# Patient Record
Sex: FEMALE | Race: WHITE | Employment: UNEMPLOYED | URBAN - METROPOLITAN AREA
[De-identification: names, ages, dates, MRNs, and addresses within clinical notes are randomized per-mention and may not be internally consistent; named-entity substitution may affect disease eponyms.]

---

## 2018-03-19 ENCOUNTER — HOSPITAL ENCOUNTER (OUTPATIENT)
Age: 83
Discharge: HOME | End: 2018-03-21
Payer: MEDICARE

## 2018-03-19 LAB
ALBUMIN SERPL-MCNC: 4.3 G/DL (ref 3.5–5.2)
ALP BLD-CCNC: 55 U/L (ref 35–104)
ALT SERPL-CCNC: 12 U/L (ref 0–32)
ANION GAP SERPL CALCULATED.3IONS-SCNC: 13 MMOL/L (ref 7–16)
AST SERPL-CCNC: 20 U/L (ref 0–31)
BASOPHILS ABSOLUTE: 0.03 E9/L (ref 0–0.2)
BASOPHILS RELATIVE PERCENT: 0.5 % (ref 0–2)
BILIRUB SERPL-MCNC: 0.4 MG/DL (ref 0–1.2)
BUN BLDV-MCNC: 14 MG/DL (ref 8–23)
CALCIUM SERPL-MCNC: 9.4 MG/DL (ref 8.6–10.2)
CHLORIDE BLD-SCNC: 99 MMOL/L (ref 98–107)
CHOLESTEROL, TOTAL: 169 MG/DL (ref 0–199)
CO2: 29 MMOL/L (ref 22–29)
CREAT SERPL-MCNC: 0.7 MG/DL (ref 0.5–1)
EOSINOPHILS ABSOLUTE: 0.3 E9/L (ref 0.05–0.5)
EOSINOPHILS RELATIVE PERCENT: 5.5 % (ref 0–6)
GFR AFRICAN AMERICAN: >60
GFR NON-AFRICAN AMERICAN: >60 ML/MIN/1.73
GLUCOSE BLD-MCNC: 182 MG/DL (ref 74–109)
HBA1C MFR BLD: 8.2 % (ref 4.8–5.9)
HCT VFR BLD CALC: 44.1 % (ref 34–48)
HDLC SERPL-MCNC: 51 MG/DL
HEMOGLOBIN: 14 G/DL (ref 11.5–15.5)
IMMATURE GRANULOCYTES #: 0.02 E9/L
IMMATURE GRANULOCYTES %: 0.4 % (ref 0–5)
LDL CHOLESTEROL CALCULATED: 83 MG/DL (ref 0–99)
LYMPHOCYTES ABSOLUTE: 1.79 E9/L (ref 1.5–4)
LYMPHOCYTES RELATIVE PERCENT: 32.5 % (ref 20–42)
MCH RBC QN AUTO: 29.9 PG (ref 26–35)
MCHC RBC AUTO-ENTMCNC: 31.7 % (ref 32–34.5)
MCV RBC AUTO: 94.2 FL (ref 80–99.9)
MONOCYTES ABSOLUTE: 0.76 E9/L (ref 0.1–0.95)
MONOCYTES RELATIVE PERCENT: 13.8 % (ref 2–12)
NEUTROPHILS ABSOLUTE: 2.6 E9/L (ref 1.8–7.3)
NEUTROPHILS RELATIVE PERCENT: 47.3 % (ref 43–80)
PDW BLD-RTO: 13.1 FL (ref 11.5–15)
PLATELET # BLD: 181 E9/L (ref 130–450)
PMV BLD AUTO: 11 FL (ref 7–12)
POTASSIUM SERPL-SCNC: 4.6 MMOL/L (ref 3.5–5)
RBC # BLD: 4.68 E12/L (ref 3.5–5.5)
SODIUM BLD-SCNC: 141 MMOL/L (ref 132–146)
TOTAL PROTEIN: 7.4 G/DL (ref 6.4–8.3)
TRIGL SERPL-MCNC: 174 MG/DL (ref 0–149)
TSH SERPL DL<=0.05 MIU/L-ACNC: 2.81 UIU/ML (ref 0.27–4.2)
VLDLC SERPL CALC-MCNC: 35 MG/DL
WBC # BLD: 5.5 E9/L (ref 4.5–11.5)

## 2018-03-19 PROCEDURE — 84443 ASSAY THYROID STIM HORMONE: CPT

## 2018-03-19 PROCEDURE — 83036 HEMOGLOBIN GLYCOSYLATED A1C: CPT

## 2018-03-19 PROCEDURE — 85025 COMPLETE CBC W/AUTO DIFF WBC: CPT

## 2018-03-19 PROCEDURE — 80061 LIPID PANEL: CPT

## 2018-03-19 PROCEDURE — 80053 COMPREHEN METABOLIC PANEL: CPT

## 2018-06-18 ENCOUNTER — HOSPITAL ENCOUNTER (OUTPATIENT)
Age: 83
Discharge: HOME | End: 2018-06-20
Payer: MEDICARE

## 2018-06-18 LAB
BILIRUBIN URINE: NEGATIVE
BLOOD, URINE: NEGATIVE
CLARITY: CLEAR
COLOR: YELLOW
GLUCOSE URINE: >=1000 MG/DL
HBA1C MFR BLD: 8.1 % (ref 4.8–5.9)
KETONES, URINE: NEGATIVE MG/DL
LEUKOCYTE ESTERASE, URINE: NEGATIVE
NITRITE, URINE: NEGATIVE
PH UA: 6 (ref 5–9)
PROTEIN UA: NEGATIVE MG/DL
SPECIFIC GRAVITY UA: <=1.005 (ref 1–1.03)
UROBILINOGEN, URINE: 0.2 E.U./DL

## 2018-06-18 PROCEDURE — 81003 URINALYSIS AUTO W/O SCOPE: CPT

## 2018-06-18 PROCEDURE — 83036 HEMOGLOBIN GLYCOSYLATED A1C: CPT

## 2018-08-08 ENCOUNTER — APPOINTMENT (OUTPATIENT)
Dept: GENERAL RADIOLOGY | Age: 83
End: 2018-08-08
Payer: MEDICARE

## 2018-08-08 ENCOUNTER — HOSPITAL ENCOUNTER (EMERGENCY)
Age: 83
Discharge: HOME | End: 2018-08-08
Attending: EMERGENCY MEDICINE
Payer: MEDICARE

## 2018-08-08 VITALS
TEMPERATURE: 98.8 F | BODY MASS INDEX: 24.75 KG/M2 | HEART RATE: 75 BPM | HEIGHT: 64 IN | WEIGHT: 145 LBS | RESPIRATION RATE: 16 BRPM | OXYGEN SATURATION: 94 % | SYSTOLIC BLOOD PRESSURE: 111 MMHG | DIASTOLIC BLOOD PRESSURE: 54 MMHG

## 2018-08-08 DIAGNOSIS — S92.355A NONDISPLACED FRACTURE OF FIFTH METATARSAL BONE, LEFT FOOT, INITIAL ENCOUNTER FOR CLOSED FRACTURE: Primary | ICD-10-CM

## 2018-08-08 PROCEDURE — 72100 X-RAY EXAM L-S SPINE 2/3 VWS: CPT

## 2018-08-08 PROCEDURE — 73070 X-RAY EXAM OF ELBOW: CPT

## 2018-08-08 PROCEDURE — 99283 EMERGENCY DEPT VISIT LOW MDM: CPT

## 2018-08-08 PROCEDURE — 73630 X-RAY EXAM OF FOOT: CPT

## 2018-08-08 PROCEDURE — 73110 X-RAY EXAM OF WRIST: CPT

## 2018-08-08 PROCEDURE — 73502 X-RAY EXAM HIP UNI 2-3 VIEWS: CPT

## 2018-08-08 PROCEDURE — 6370000000 HC RX 637 (ALT 250 FOR IP): Performed by: EMERGENCY MEDICINE

## 2018-08-08 PROCEDURE — 73590 X-RAY EXAM OF LOWER LEG: CPT

## 2018-08-08 PROCEDURE — 73030 X-RAY EXAM OF SHOULDER: CPT

## 2018-08-08 PROCEDURE — 73552 X-RAY EXAM OF FEMUR 2/>: CPT

## 2018-08-08 RX ORDER — LANCETS 33 GAUGE
EACH MISCELLANEOUS
COMMUNITY
End: 2018-10-02 | Stop reason: ALTCHOICE

## 2018-08-08 RX ORDER — HYDROCODONE BITARTRATE AND ACETAMINOPHEN 5; 325 MG/1; MG/1
1 TABLET ORAL ONCE
Status: COMPLETED | OUTPATIENT
Start: 2018-08-08 | End: 2018-08-08

## 2018-08-08 RX ORDER — RIVASTIGMINE 4.6 MG/24H
1 PATCH, EXTENDED RELEASE TRANSDERMAL NIGHTLY
COMMUNITY
End: 2018-10-02 | Stop reason: ALTCHOICE

## 2018-08-08 RX ORDER — IBUPROFEN 800 MG/1
800 TABLET ORAL EVERY 8 HOURS PRN
Qty: 21 TABLET | Refills: 0 | Status: SHIPPED | OUTPATIENT
Start: 2018-08-08 | End: 2018-10-02 | Stop reason: ALTCHOICE

## 2018-08-08 RX ORDER — ACARBOSE 25 MG/1
25 TABLET ORAL
COMMUNITY

## 2018-08-08 RX ADMIN — HYDROCODONE BITARTRATE AND ACETAMINOPHEN 1 TABLET: 5; 325 TABLET ORAL at 22:48

## 2018-08-08 ASSESSMENT — PAIN SCALES - GENERAL
PAINLEVEL_OUTOF10: 8
PAINLEVEL_OUTOF10: 10

## 2018-08-08 ASSESSMENT — PAIN DESCRIPTION - FREQUENCY: FREQUENCY: CONTINUOUS

## 2018-08-08 ASSESSMENT — ENCOUNTER SYMPTOMS
SORE THROAT: 0
DIARRHEA: 0
EYE PAIN: 0
WHEEZING: 0
COUGH: 0
SINUS PRESSURE: 0
VOMITING: 0
EYE DISCHARGE: 0
NAUSEA: 0
EYE REDNESS: 0
SHORTNESS OF BREATH: 0
ABDOMINAL DISTENTION: 0
BACK PAIN: 0

## 2018-08-08 ASSESSMENT — PAIN DESCRIPTION - LOCATION: LOCATION: FOOT;HIP;LEG

## 2018-08-08 ASSESSMENT — PAIN DESCRIPTION - ORIENTATION: ORIENTATION: RIGHT

## 2018-08-08 ASSESSMENT — PAIN DESCRIPTION - DESCRIPTORS: DESCRIPTORS: SORE

## 2018-08-09 NOTE — ED NOTES
Pt to and from xray via cart. Pt medicated for pain and post op shoe applied. Pt in no apparent distress.       Jesús Tenorio RN  08/08/18 [id number]

## 2018-08-09 NOTE — ED PROVIDER NOTES
are normal. She exhibits no distension. There is no tenderness. There is no rebound and no guarding. Musculoskeletal:        Right shoulder: She exhibits tenderness and bony tenderness. She exhibits no swelling, no effusion, no crepitus and no deformity. Right elbow: She exhibits no swelling, no effusion and no deformity. Tenderness found. Olecranon process tenderness noted. Right wrist: She exhibits tenderness and bony tenderness. She exhibits no swelling, no effusion, no crepitus and no deformity. Right hip: She exhibits tenderness and bony tenderness. She exhibits no swelling, no crepitus and no deformity. Right knee: She exhibits no swelling, no effusion, no ecchymosis and no deformity. No tenderness found. Right ankle: She exhibits no swelling, no ecchymosis and no deformity. No tenderness. Lumbar back: She exhibits tenderness and bony tenderness. She exhibits no swelling, no edema and no deformity. Right upper arm: She exhibits no tenderness, no bony tenderness, no swelling, no edema and no deformity. Right forearm: Normal. She exhibits no tenderness, no bony tenderness, no swelling, no edema and no deformity. Right hand: Normal. She exhibits no tenderness, no bony tenderness, no deformity and no swelling. Right upper leg: She exhibits no tenderness, no bony tenderness, no swelling, no edema and no deformity. Right lower leg: She exhibits tenderness and bony tenderness. She exhibits no swelling, no edema and no deformity. Right foot: There is tenderness, bony tenderness and swelling. There is no crepitus and no deformity. Neurological: She is alert and oriented to person, place, and time. Skin: Skin is warm and dry. She is not diaphoretic. Nursing note and vitals reviewed.       Procedures    [facility]              --------------------------------------------- PAST HISTORY ---------------------------------------------  Past Medical History:  has a past medical history of Diabetes mellitus ([address] Hypertension; Tachycardia; and TIA (transient ischemic attack). Past Surgical History:  has a past surgical history that includes joint replacement. Social History:  reports that she has never smoked. She has never used smokeless tobacco. She reports that she does not drink alcohol or use drugs. Family History: family history includes Arthritis in her father, maternal aunt, maternal uncle, mother, paternal aunt, and paternal uncle; Diabetes in her maternal aunt, mother, paternal aunt, and sister; Hearing Loss in her mother and sister; Heart Disease in her maternal grandmother and maternal uncle; High Blood Pressure in her mother and sister; High Cholesterol in her sister; Kidney Disease in her sister. The patients home medications have been reviewed. Allergies: Sulfa antibiotics    -------------------------------------------------- RESULTS -------------------------------------------------  Labs:  No results found for this visit on 08/08/18. Radiology:  XR FOOT RIGHT (MIN 3 VIEWS)   Final Result      Hip: No acute findings. Femur: No acute findings. Tibia/fibula: No acute findings. Foot: Nondisplaced fracture base of the fifth metatarsal.      XR HIP RIGHT (2-3 VIEWS)   Final Result      Hip: No acute findings. Femur: No acute findings. Tibia/fibula: No acute findings. Foot: Nondisplaced fracture base of the fifth metatarsal.      XR FEMUR RIGHT (MIN 2 VIEWS)   Final Result      Hip: No acute findings. Femur: No acute findings. Tibia/fibula: No acute findings. Foot: Nondisplaced fracture base of the fifth metatarsal.      XR TIBIA FIBULA RIGHT (2 VIEWS)   Final Result      Hip: No acute findings. Femur: No acute findings. Tibia/fibula: No acute findings.       Foot: Nondisplaced fracture base of the fifth metatarsal.      XR LUMBAR SPINE (2-3 VIEWS)   Final Result   No acute findings. XR SHOULDER RIGHT (MIN 2 VIEWS)   Final Result   Shoulder: No acute findings. Elbow: No acute findings. Wrist: No acute findings. XR ELBOW RIGHT (2 VIEWS)   Final Result   Shoulder: No acute findings. Elbow: No acute findings. Wrist: No acute findings. XR WRIST RIGHT (MIN 3 VIEWS)   Final Result   Shoulder: No acute findings. Elbow: No acute findings. Wrist: No acute findings. ------------------------- NURSING NOTES AND VITALS REVIEWED ---------------------------  Date / Time Roomed:  8/8/2018  9:25 PM  ED Bed Assignment:  [id number]    The nursing notes within the ED encounter and vital signs as below have been reviewed. BP (!) 111/54   Pulse 75   Temp 98.8 °F (37.1 °C) (Oral)   Resp 16   Ht 5' 4\" (1.626 m)   Wt 145 lb (65.8 kg)   SpO2 94%   BMI 24.89 kg/m²   Oxygen Saturation Interpretation: Normal      ------------------------------------------ PROGRESS NOTES ------------------------------------------  I have spoken with the patient and discussed todays results, in addition to providing specific details for the plan of care and counseling regarding the diagnosis and prognosis. Their questions are answered at this time and they are agreeable with the plan. I discussed at length with them reasons for immediate return here for re evaluation. They will followup with primary care by calling their office tomorrow. --------------------------------- ADDITIONAL PROVIDER NOTES ---------------------------------  At this time the patient is without objective evidence of an acute process requiring hospitalization or inpatient management. They have remained hemodynamically stable throughout their entire ED visit and are stable for discharge with outpatient follow-up. The plan has been discussed in detail and they are aware of the specific conditions for emergent return, as well as the importance of follow-up.       New Prescriptions IBUPROFEN (IBU) 800 MG TABLET    Take 1 tablet by mouth every 8 hours as needed for Pain       Diagnosis:  1. Nondisplaced fracture of fifth metatarsal bone, left foot, initial encounter for closed fracture        Disposition:  Patient's disposition: Discharge to home  Patient's condition is stable.          [address] [facility]  08/08/18 [id number]

## 2018-10-02 ENCOUNTER — APPOINTMENT (OUTPATIENT)
Dept: CT IMAGING | Age: 83
DRG: 872 | End: 2018-10-02
Payer: MEDICARE

## 2018-10-02 ENCOUNTER — HOSPITAL ENCOUNTER (INPATIENT)
Age: 83
LOS: 4 days | Discharge: NURSING, REHAB OR LONG-TERM CARE | DRG: 872 | End: 2018-10-06
Attending: EMERGENCY MEDICINE | Admitting: INTERNAL MEDICINE
Payer: MEDICARE

## 2018-10-02 ENCOUNTER — APPOINTMENT (OUTPATIENT)
Dept: GENERAL RADIOLOGY | Age: 83
DRG: 872 | End: 2018-10-02
Payer: MEDICARE

## 2018-10-02 DIAGNOSIS — J18.9 PNEUMONIA DUE TO ORGANISM: ICD-10-CM

## 2018-10-02 DIAGNOSIS — A41.9 SEPSIS, DUE TO UNSPECIFIED ORGANISM: Primary | ICD-10-CM

## 2018-10-02 DIAGNOSIS — R09.02 HYPOXIA: ICD-10-CM

## 2018-10-02 DIAGNOSIS — R53.83 LETHARGY: ICD-10-CM

## 2018-10-02 DIAGNOSIS — N39.0 URINARY TRACT INFECTION WITHOUT HEMATURIA, SITE UNSPECIFIED: ICD-10-CM

## 2018-10-02 LAB
ALBUMIN SERPL-MCNC: 3.7 G/DL (ref 3.5–5.2)
ALP BLD-CCNC: 78 U/L (ref 35–104)
ALT SERPL-CCNC: 12 U/L (ref 0–32)
ANION GAP SERPL CALCULATED.3IONS-SCNC: 15 MMOL/L (ref 7–16)
AST SERPL-CCNC: 16 U/L (ref 0–31)
BACTERIA: ABNORMAL /HPF
BASOPHILS ABSOLUTE: 0 E9/L (ref 0–0.2)
BASOPHILS RELATIVE PERCENT: 0 % (ref 0–2)
BILIRUB SERPL-MCNC: 0.4 MG/DL (ref 0–1.2)
BILIRUBIN URINE: NEGATIVE
BLOOD, URINE: ABNORMAL
BUN BLDV-MCNC: 24 MG/DL (ref 8–23)
CALCIUM SERPL-MCNC: 8.6 MG/DL (ref 8.6–10.2)
CHLORIDE BLD-SCNC: 93 MMOL/L (ref 98–107)
CHP ED QC CHECK: NORMAL
CLARITY: ABNORMAL
CO2: 24 MMOL/L (ref 22–29)
COLOR: YELLOW
CREAT SERPL-MCNC: 0.9 MG/DL (ref 0.5–1)
EKG ATRIAL RATE: 87 BPM
EKG P AXIS: 8 DEGREES
EKG P-R INTERVAL: 130 MS
EKG Q-T INTERVAL: 380 MS
EKG QRS DURATION: 94 MS
EKG QTC CALCULATION (BAZETT): 457 MS
EKG R AXIS: -50 DEGREES
EKG T AXIS: 75 DEGREES
EKG VENTRICULAR RATE: 87 BPM
EOSINOPHILS ABSOLUTE: 0 E9/L (ref 0.05–0.5)
EOSINOPHILS RELATIVE PERCENT: 0 % (ref 0–6)
EPITHELIAL CELLS, UA: ABNORMAL /HPF
GFR AFRICAN AMERICAN: >60
GFR NON-AFRICAN AMERICAN: 58 ML/MIN/1.73
GLUCOSE BLD-MCNC: 288 MG/DL
GLUCOSE BLD-MCNC: 290 MG/DL (ref 74–109)
GLUCOSE URINE: >=1000 MG/DL
HCT VFR BLD CALC: 35.2 % (ref 34–48)
HEMOGLOBIN: 11.8 G/DL (ref 11.5–15.5)
KETONES, URINE: 15 MG/DL
LACTIC ACID: 1.3 MMOL/L (ref 0.5–2.2)
LEUKOCYTE ESTERASE, URINE: ABNORMAL
LIPASE: 17 U/L (ref 13–60)
LYMPHOCYTES ABSOLUTE: 0.62 E9/L (ref 1.5–4)
LYMPHOCYTES RELATIVE PERCENT: 7 % (ref 20–42)
MCH RBC QN AUTO: 30.6 PG (ref 26–35)
MCHC RBC AUTO-ENTMCNC: 33.5 % (ref 32–34.5)
MCV RBC AUTO: 91.4 FL (ref 80–99.9)
MONOCYTES ABSOLUTE: 1.42 E9/L (ref 0.1–0.95)
MONOCYTES RELATIVE PERCENT: 16 % (ref 2–12)
NEUTROPHILS ABSOLUTE: 6.85 E9/L (ref 1.8–7.3)
NEUTROPHILS RELATIVE PERCENT: 77 % (ref 43–80)
NITRITE, URINE: NEGATIVE
PDW BLD-RTO: 12.6 FL (ref 11.5–15)
PH UA: 5.5 (ref 5–9)
PLATELET # BLD: 163 E9/L (ref 130–450)
PMV BLD AUTO: 10.1 FL (ref 7–12)
POC ANION GAP: 17
POC BUN: 23
POC CHLORIDE: 95
POC CO2: 24
POC CREATININE: 0.9
POC POTASSIUM: NORMAL
POC SODIUM: 131
POTASSIUM SERPL-SCNC: 4.3 MMOL/L (ref 3.5–5)
PROTEIN UA: ABNORMAL MG/DL
RBC # BLD: 3.85 E12/L (ref 3.5–5.5)
RBC # BLD: NORMAL 10*6/UL
RBC UA: ABNORMAL /HPF (ref 0–2)
SODIUM BLD-SCNC: 132 MMOL/L (ref 132–146)
SPECIFIC GRAVITY UA: <=1.005 (ref 1–1.03)
TOTAL PROTEIN: 7.2 G/DL (ref 6.4–8.3)
TROPONIN: <0.01 NG/ML (ref 0–0.03)
UROBILINOGEN, URINE: 0.2 E.U./DL
WBC # BLD: 8.9 E9/L (ref 4.5–11.5)
WBC UA: >20 /HPF (ref 0–5)

## 2018-10-02 PROCEDURE — 74177 CT ABD & PELVIS W/CONTRAST: CPT

## 2018-10-02 PROCEDURE — 93005 ELECTROCARDIOGRAM TRACING: CPT | Performed by: EMERGENCY MEDICINE

## 2018-10-02 PROCEDURE — 36415 COLL VENOUS BLD VENIPUNCTURE: CPT

## 2018-10-02 PROCEDURE — 6370000000 HC RX 637 (ALT 250 FOR IP): Performed by: EMERGENCY MEDICINE

## 2018-10-02 PROCEDURE — 85025 COMPLETE CBC W/AUTO DIFF WBC: CPT

## 2018-10-02 PROCEDURE — 83605 ASSAY OF LACTIC ACID: CPT

## 2018-10-02 PROCEDURE — 87581 M.PNEUMON DNA AMP PROBE: CPT

## 2018-10-02 PROCEDURE — 87040 BLOOD CULTURE FOR BACTERIA: CPT

## 2018-10-02 PROCEDURE — 84484 ASSAY OF TROPONIN QUANT: CPT

## 2018-10-02 PROCEDURE — 87798 DETECT AGENT NOS DNA AMP: CPT

## 2018-10-02 PROCEDURE — 2580000003 HC RX 258: Performed by: EMERGENCY MEDICINE

## 2018-10-02 PROCEDURE — 87486 CHLMYD PNEUM DNA AMP PROBE: CPT

## 2018-10-02 PROCEDURE — 2060000000 HC ICU INTERMEDIATE R&B

## 2018-10-02 PROCEDURE — 99212 OFFICE O/P EST SF 10 MIN: CPT

## 2018-10-02 PROCEDURE — 87088 URINE BACTERIA CULTURE: CPT

## 2018-10-02 PROCEDURE — 87633 RESP VIRUS 12-25 TARGETS: CPT

## 2018-10-02 PROCEDURE — 71045 X-RAY EXAM CHEST 1 VIEW: CPT

## 2018-10-02 PROCEDURE — 81001 URINALYSIS AUTO W/SCOPE: CPT

## 2018-10-02 PROCEDURE — 6360000002 HC RX W HCPCS: Performed by: EMERGENCY MEDICINE

## 2018-10-02 PROCEDURE — 80053 COMPREHEN METABOLIC PANEL: CPT

## 2018-10-02 PROCEDURE — 6360000004 HC RX CONTRAST MEDICATION: Performed by: RADIOLOGY

## 2018-10-02 PROCEDURE — 70450 CT HEAD/BRAIN W/O DYE: CPT

## 2018-10-02 PROCEDURE — 83690 ASSAY OF LIPASE: CPT

## 2018-10-02 PROCEDURE — 99285 EMERGENCY DEPT VISIT HI MDM: CPT

## 2018-10-02 PROCEDURE — 87186 SC STD MICRODIL/AGAR DIL: CPT

## 2018-10-02 PROCEDURE — 2500000003 HC RX 250 WO HCPCS: Performed by: EMERGENCY MEDICINE

## 2018-10-02 RX ORDER — ACARBOSE 25 MG/1
25 TABLET ORAL
Status: DISCONTINUED | OUTPATIENT
Start: 2018-10-03 | End: 2018-10-06 | Stop reason: HOSPADM

## 2018-10-02 RX ORDER — ATENOLOL 25 MG/1
25 TABLET ORAL 2 TIMES DAILY
Status: DISCONTINUED | OUTPATIENT
Start: 2018-10-03 | End: 2018-10-06 | Stop reason: HOSPADM

## 2018-10-02 RX ORDER — ACETAMINOPHEN 500 MG
1000 TABLET ORAL ONCE
Status: COMPLETED | OUTPATIENT
Start: 2018-10-02 | End: 2018-10-02

## 2018-10-02 RX ORDER — ASPIRIN 81 MG/1
81 TABLET ORAL NIGHTLY
Status: DISCONTINUED | OUTPATIENT
Start: 2018-10-02 | End: 2018-10-06 | Stop reason: HOSPADM

## 2018-10-02 RX ORDER — OYSTER SHELL CALCIUM WITH VITAMIN D 500; 200 MG/1; [IU]/1
1 TABLET, FILM COATED ORAL EVERY EVENING
Status: DISCONTINUED | OUTPATIENT
Start: 2018-10-03 | End: 2018-10-06 | Stop reason: HOSPADM

## 2018-10-02 RX ORDER — 0.9 % SODIUM CHLORIDE 0.9 %
500 INTRAVENOUS SOLUTION INTRAVENOUS ONCE
Status: COMPLETED | OUTPATIENT
Start: 2018-10-02 | End: 2018-10-02

## 2018-10-02 RX ORDER — LOVASTATIN 20 MG/1
40 TABLET ORAL EVERY EVENING
Status: DISCONTINUED | OUTPATIENT
Start: 2018-10-02 | End: 2018-10-02 | Stop reason: CLARIF

## 2018-10-02 RX ORDER — GLIMEPIRIDE 4 MG/1
4 TABLET ORAL 2 TIMES DAILY WITH MEALS
Status: DISCONTINUED | OUTPATIENT
Start: 2018-10-03 | End: 2018-10-04

## 2018-10-02 RX ORDER — AZITHROMYCIN 250 MG/1
500 TABLET, FILM COATED ORAL DAILY
Status: DISCONTINUED | OUTPATIENT
Start: 2018-10-03 | End: 2018-10-03

## 2018-10-02 RX ORDER — SIMVASTATIN 20 MG
20 TABLET ORAL NIGHTLY
Status: DISCONTINUED | OUTPATIENT
Start: 2018-10-03 | End: 2018-10-06 | Stop reason: HOSPADM

## 2018-10-02 RX ORDER — GLIMEPIRIDE 4 MG/1
4 TABLET ORAL 2 TIMES DAILY WITH MEALS
Status: ON HOLD | COMMUNITY
End: 2018-10-06 | Stop reason: HOSPADM

## 2018-10-02 RX ORDER — MULTIVIT-MIN/IRON/FOLIC ACID/K 18-600-40
1 CAPSULE ORAL EVERY EVENING
Status: DISCONTINUED | OUTPATIENT
Start: 2018-10-02 | End: 2018-10-02 | Stop reason: CLARIF

## 2018-10-02 RX ORDER — M-VIT,TX,IRON,MINS/CALC/FOLIC 27MG-0.4MG
1 TABLET ORAL
Status: DISCONTINUED | OUTPATIENT
Start: 2018-10-03 | End: 2018-10-06 | Stop reason: HOSPADM

## 2018-10-02 RX ORDER — LOSARTAN POTASSIUM 50 MG/1
100 TABLET ORAL EVERY EVENING
Status: DISCONTINUED | OUTPATIENT
Start: 2018-10-03 | End: 2018-10-06 | Stop reason: HOSPADM

## 2018-10-02 RX ORDER — ACARBOSE 25 MG/1
25 TABLET ORAL
Status: DISCONTINUED | OUTPATIENT
Start: 2018-10-03 | End: 2018-10-02 | Stop reason: CLARIF

## 2018-10-02 RX ORDER — M-VIT,TX,IRON,MINS/CALC/FOLIC 27MG-0.4MG
1 TABLET ORAL DAILY
COMMUNITY

## 2018-10-02 RX ORDER — RIVASTIGMINE 9.5 MG/24H
1 PATCH, EXTENDED RELEASE TRANSDERMAL NIGHTLY
Status: DISCONTINUED | OUTPATIENT
Start: 2018-10-03 | End: 2018-10-06 | Stop reason: HOSPADM

## 2018-10-02 RX ORDER — ACETAMINOPHEN 500 MG
500 TABLET ORAL
Status: DISCONTINUED | OUTPATIENT
Start: 2018-10-02 | End: 2018-10-06 | Stop reason: HOSPADM

## 2018-10-02 RX ORDER — AMLODIPINE BESYLATE 2.5 MG/1
2.5 TABLET ORAL EVERY EVENING
Status: DISCONTINUED | OUTPATIENT
Start: 2018-10-03 | End: 2018-10-06 | Stop reason: HOSPADM

## 2018-10-02 RX ORDER — MULTIVIT-MIN/IRON/FOLIC ACID/K 18-600-40
1 CAPSULE ORAL EVERY EVENING
Status: ON HOLD | COMMUNITY
End: 2018-10-06 | Stop reason: HOSPADM

## 2018-10-02 RX ORDER — MECLIZINE HCL 12.5 MG/1
25 TABLET ORAL EVERY 8 HOURS PRN
Status: DISCONTINUED | OUTPATIENT
Start: 2018-10-02 | End: 2018-10-06 | Stop reason: HOSPADM

## 2018-10-02 RX ORDER — RIVASTIGMINE 9.5 MG/24H
1 PATCH, EXTENDED RELEASE TRANSDERMAL DAILY
COMMUNITY

## 2018-10-02 RX ORDER — 0.9 % SODIUM CHLORIDE 0.9 %
1000 INTRAVENOUS SOLUTION INTRAVENOUS ONCE
Status: COMPLETED | OUTPATIENT
Start: 2018-10-02 | End: 2018-10-02

## 2018-10-02 RX ORDER — SODIUM CHLORIDE 450 MG/100ML
INJECTION, SOLUTION INTRAVENOUS CONTINUOUS
Status: DISCONTINUED | OUTPATIENT
Start: 2018-10-02 | End: 2018-10-06 | Stop reason: HOSPADM

## 2018-10-02 RX ORDER — PAROXETINE HYDROCHLORIDE 20 MG/1
30 TABLET, FILM COATED ORAL EVERY EVENING
Status: DISCONTINUED | OUTPATIENT
Start: 2018-10-02 | End: 2018-10-06 | Stop reason: HOSPADM

## 2018-10-02 RX ORDER — ACETAMINOPHEN 500 MG
500 TABLET ORAL 2 TIMES DAILY
COMMUNITY
End: 2019-08-02 | Stop reason: ALTCHOICE

## 2018-10-02 RX ORDER — OMEGA-3/DHA/EPA/FISH OIL 300-1000MG
1 CAPSULE ORAL
Status: DISCONTINUED | OUTPATIENT
Start: 2018-10-02 | End: 2018-10-02 | Stop reason: CLARIF

## 2018-10-02 RX ADMIN — DOXYCYCLINE 100 MG: 100 INJECTION, POWDER, LYOPHILIZED, FOR SOLUTION INTRAVENOUS at 20:46

## 2018-10-02 RX ADMIN — IOPAMIDOL 80 ML: 755 INJECTION, SOLUTION INTRAVENOUS at 19:14

## 2018-10-02 RX ADMIN — SODIUM CHLORIDE 500 ML: 9 INJECTION, SOLUTION INTRAVENOUS at 19:34

## 2018-10-02 RX ADMIN — SODIUM CHLORIDE 500 ML: 9 INJECTION, SOLUTION INTRAVENOUS at 18:00

## 2018-10-02 RX ADMIN — CEFTRIAXONE 1 G: 1 INJECTION, POWDER, FOR SOLUTION INTRAMUSCULAR; INTRAVENOUS at 20:37

## 2018-10-02 RX ADMIN — ACETAMINOPHEN 1000 MG: 500 TABLET, FILM COATED ORAL at 18:00

## 2018-10-02 RX ADMIN — SODIUM CHLORIDE 1000 ML: 9 INJECTION, SOLUTION INTRAVENOUS at 20:51

## 2018-10-02 ASSESSMENT — ENCOUNTER SYMPTOMS
SORE THROAT: 0
VOMITING: 1
SHORTNESS OF BREATH: 0
SINUS PRESSURE: 0
COUGH: 1
NAUSEA: 1
DIARRHEA: 0
WHEEZING: 0
BACK PAIN: 0
EYE PAIN: 0
ABDOMINAL PAIN: 1

## 2018-10-02 NOTE — ED NOTES
Pt pulled out of shared waiting room     Doris Estrada, [facility]  10/02/18 2533

## 2018-10-02 NOTE — ED PROVIDER NOTES
Tachycardia; and TIA (transient ischemic attack). Past Surgical History:  has a past surgical history that includes joint replacement. Social History:  reports that she has never smoked. She has never used smokeless tobacco. She reports that she does not drink alcohol or use drugs. Family History: family history includes Arthritis in her father, maternal aunt, maternal uncle, mother, paternal aunt, and paternal uncle; Diabetes in her maternal aunt, mother, paternal aunt, and sister; Hearing Loss in her mother and sister; Heart Disease in her maternal grandmother and maternal uncle; High Blood Pressure in her mother and sister; High Cholesterol in her sister; Kidney Disease in her sister. The patients home medications have been reviewed. Allergies: Sulfa antibiotics    -------------------------------------------------- RESULTS -------------------------------------------------    EKG Interpretation    Interpreted by emergency department physician    Rhythm: normal sinus   Rate: 87  Axis: left  Ectopy: none  Conduction: normal  ST Segments: normal  T Waves: normal  Q Waves: none    Clinical Impression: Normal sinus rhythm.     Krystal Meyer      Lab  Results for orders placed or performed during the hospital encounter of 10/02/18   CBC auto differential   Result Value Ref Range    WBC 8.9 4.5 - 11.5 E9/L    RBC 3.85 3.50 - 5.50 E12/L    Hemoglobin 11.8 11.5 - 15.5 g/dL    Hematocrit 35.2 34.0 - 48.0 %    MCV 91.4 80.0 - 99.9 fL    MCH 30.6 26.0 - 35.0 pg    MCHC 33.5 32.0 - 34.5 %    RDW 12.6 11.5 - 15.0 fL    Platelets 677 716 - 592 E9/L    MPV 10.1 7.0 - 12.0 fL    Neutrophils % 77.0 43.0 - 80.0 %    Lymphocytes % 7.0 (L) 20.0 - 42.0 %    Monocytes % 16.0 (H) 2.0 - 12.0 %    Eosinophils % 0.0 0.0 - 6.0 %    Basophils % 0.0 0.0 - 2.0 %    Neutrophils # 6.85 1.80 - 7.30 E9/L    Lymphocytes # 0.62 (L) 1.50 - 4.00 E9/L    Monocytes # 1.42 (H) 0.10 - 0.95 E9/L    Eosinophils # 0.00 (L) 0.05 - 0.50 E9/L Basophils # 0.00 0.00 - 0.20 E9/L    RBC Morphology Normal    Comprehensive Metabolic Panel   Result Value Ref Range    Sodium 132 132 - 146 mmol/L    Potassium 4.3 3.5 - 5.0 mmol/L    Chloride 93 (L) 98 - 107 mmol/L    CO2 24 22 - 29 mmol/L    Anion Gap 15 7 - 16 mmol/L    Glucose 290 (H) 74 - 109 mg/dL    BUN 24 (H) 8 - 23 mg/dL    CREATININE 0.9 0.5 - 1.0 mg/dL    GFR Non-African American 58 >=60 mL/min/1.73    GFR African American >60     Calcium 8.6 8.6 - 10.2 mg/dL    Total Protein 7.2 6.4 - 8.3 g/dL    Alb 3.7 3.5 - 5.2 g/dL    Total Bilirubin 0.4 0.0 - 1.2 mg/dL    Alkaline Phosphatase 78 35 - 104 U/L    ALT 12 0 - 32 U/L    AST 16 0 - 31 U/L   Lipase   Result Value Ref Range    Lipase 17 13 - 60 U/L   Lactic Acid, Plasma   Result Value Ref Range    Lactic Acid 1.3 0.5 - 2.2 mmol/L   Troponin   Result Value Ref Range    Troponin <0.01 0.00 - 0.03 ng/mL   Urinalysis with Microscopic   Result Value Ref Range    Color, UA Yellow Straw/Yellow    Clarity, UA CLOUDY (A) Clear    Glucose, Ur >=1000 (A) Negative mg/dL    Bilirubin Urine Negative Negative    Ketones, Urine 15 (A) Negative mg/dL    Specific Gravity, UA <=1.005 1.005 - 1.030    Blood, Urine SMALL (A) Negative    pH, UA 5.5 5.0 - 9.0    Protein, UA TRACE Negative mg/dL    Urobilinogen, Urine 0.2 <2.0 E.U./dL    Nitrite, Urine Negative Negative    Leukocyte Esterase, Urine SMALL (A) Negative    WBC, UA >20 0 - 5 /HPF    RBC, UA 5-10 (A) 0 - 2 /HPF    Epi Cells MANY /HPF    Bacteria, UA MANY (A) /HPF   POCT chem basic w/ ICA   Result Value Ref Range    POC BUN 23     POC Chloride 95     POC Creatinine 0.9     POC Anion Gap 17     POC Glucose 288     POC Potassium      POC Sodium 131     POC CO2 24     QC OK? ok    EKG 12 Lead   Result Value Ref Range    Ventricular Rate 87 BPM    Atrial Rate 87 BPM    P-R Interval 130 ms    QRS Duration 94 ms    Q-T Interval 380 ms    QTc Calculation (Bazett) 457 ms    P Axis 8 degrees    R Axis -50 degrees    T Axis 75 degrees       Radiology  Ct Head Wo Contrast    Result Date: 10/2/2018  LOCATION: 200 EXAM: CT HEAD WO CONTRAST COMPARISON: None HISTORY: Lethargy TECHNIQUE: Noncontrast helical CT images were performed of the head. Coronal and sagittal reconstructions also obtained. Automated dose control was used for this exam. CONTRAST: No intravenous contrast administered. FINDINGS: HEAD: There is moderate diffuse atrophy. A moderate amount of patchy periventricular and subcortical white matter hypodensity noted, likely chronic microvascular ischemic related. There is no evidence of intracranial hemorrhage or mass. No extra-axial fluid is seen. The paranasal sinuses are clear. The globes and orbits are normal.  No abnormalities of the calvarium are identified. 1. No acute findings. Ct Abdomen Pelvis W Iv Contrast Additional Contrast? None    Result Date: 10/2/2018  LOCATION:200 EXAM: CT ABDOMEN PELVIS W IV CONTRAST COMPARISON: None HISTORY: Left-sided pain TECHNIQUE:Contrast-enhanced helical abdomen and pelvis CT was performed. Coronal and sagittal reconstructions also obtained. Automated dose control was used for this exam. CONTRAST: 80 mL Isovue-370 intravenous contrast was administered. No oral contrast administered. FINDINGS: SUPPORT DEVICES: None LOWER THORAX: Limited evaluation of the lower chest demonstrates clear lung bases bilaterally. SOLID ORGANS: The liver, spleen, pancreas, and adrenal glands are normal. BILIARY SYSTEM: No evidence of biliary ductal dilatation. GENITOURINARY: The kidneys are normal in appearance bilaterally with no evidence of hydronephrosis. No stones are appreciated. The bladder is unremarkable. GASTROINTESTINAL: The stomach, small and large bowel are normal in caliber without evidence of focal wall thickening. There is no evidence of bowel obstruction. APPENDIX: Within normal limits without adjacent fat stranding. FLUID COLLECTIONS: None.  LYMPH NODES: No adenopathy by size

## 2018-10-02 NOTE — ED PROVIDER NOTES
------------------------- NURSING NOTES AND VITALS REVIEWED ---------------------------   The nursing notes within the ED encounter and vital signs as below have been reviewed. /73   Pulse 86   Temp 102.9 °F (39.4 °C) (Oral)   Resp 20   Wt 145 lb (65.8 kg)   SpO2 (!) 88%   BMI 24.89 kg/m²   Oxygen Saturation Interpretation: Normal      ---------------------------------------------------PHYSICAL EXAM--------------------------------------      Constitutional/General: Lethargic, ill-appearing, falls asleep in a wheelchair arouses easily to verbal stimuli but does not answer questions appropriately. Head: Normocephalic and atraumatic  Eyes: Conjunctiva clear  Mouth: Oropharynx clear, handling secretions, no trismus  Neck: Supple, full ROM,   Pulmonary: Lungs crackles bilaterally in the lower lung fields  Cardiovascular:  Regular rate and rhythm,   Abdomen: Soft,  Tender in the left lower quadrant, non distended,   Extremities: Wheelchair. Warm and well perfused  Skin: warm and dry, pale  Neurologic: GCS 15,  Psych: Normal Affect      ------------------------------ ED COURSE/MEDICAL DECISION MAKING----------------------  Medications - No data to display      ED COURSE:       Medical Decision Making:    Patient is lethargic in the wheelchair but she does respond to verbal stimuli and opens her eyes and verbally responds but does not seem to answer appropriately. Her daughter states that she has some dementia. She has some crackles in her lower lung fields. Temp is 102.9--- oxygen saturation on room air is 88% respirations are 20 and easy. I did tell the daughter that this patient is too ill for urgent care. Daughter agrees to have her transported via ambulance. Did discuss with [place] ED--- I did spoke with Dr. Kaushal Heaton. I informed him of the patient's condition.      --------------------------------- IMPRESSION AND DISPOSITION ---------------------------------    IMPRESSION  1.  Fever, unspecified fever cause    2. Lethargy    3. Vomiting without nausea, intractability of vomiting not specified, unspecified vomiting type        DISPOSITION  Disposition: Transfer to ED via ambulance        NOTE: This report was transcribed using voice recognition software.  Every effort was made to ensure accuracy; however, inadvertent computerized transcription errors may be present     JUSTINA Flores CNP  10/02/18 [id number]

## 2018-10-03 ENCOUNTER — APPOINTMENT (OUTPATIENT)
Dept: GENERAL RADIOLOGY | Age: 83
DRG: 872 | End: 2018-10-03
Payer: MEDICARE

## 2018-10-03 LAB
ALBUMIN SERPL-MCNC: 3.1 G/DL (ref 3.5–5.2)
ALP BLD-CCNC: 62 U/L (ref 35–104)
ALT SERPL-CCNC: 10 U/L (ref 0–32)
ANION GAP SERPL CALCULATED.3IONS-SCNC: 12 MMOL/L (ref 7–16)
AST SERPL-CCNC: 16 U/L (ref 0–31)
BILIRUB SERPL-MCNC: 0.2 MG/DL (ref 0–1.2)
BUN BLDV-MCNC: 17 MG/DL (ref 8–23)
CALCIUM SERPL-MCNC: 8.1 MG/DL (ref 8.6–10.2)
CHLORIDE BLD-SCNC: 102 MMOL/L (ref 98–107)
CO2: 25 MMOL/L (ref 22–29)
CREAT SERPL-MCNC: 0.7 MG/DL (ref 0.5–1)
FILM ARRAY ADENOVIRUS: NORMAL
FILM ARRAY BORDETELLA PERTUSSIS: NORMAL
FILM ARRAY CHLAMYDOPHILIA PNEUMONIAE: NORMAL
FILM ARRAY CORONAVIRUS 229E: NORMAL
FILM ARRAY CORONAVIRUS HKU1: NORMAL
FILM ARRAY CORONAVIRUS NL63: NORMAL
FILM ARRAY CORONAVIRUS OC43: NORMAL
FILM ARRAY INFLUENZA A VIRUS 09H1: NORMAL
FILM ARRAY INFLUENZA A VIRUS H1: NORMAL
FILM ARRAY INFLUENZA A VIRUS H3: NORMAL
FILM ARRAY INFLUENZA A VIRUS: NORMAL
FILM ARRAY INFLUENZA B: NORMAL
FILM ARRAY METAPNEUMOVIRUS: NORMAL
FILM ARRAY MYCOPLASMA PNEUMONIAE: NORMAL
FILM ARRAY PARAINFLUENZA VIRUS 1: NORMAL
FILM ARRAY PARAINFLUENZA VIRUS 2: NORMAL
FILM ARRAY PARAINFLUENZA VIRUS 3: NORMAL
FILM ARRAY PARAINFLUENZA VIRUS 4: NORMAL
FILM ARRAY RESPIRATORY SYNCITIAL VIRUS: NORMAL
FILM ARRAY RHINOVIRUS/ENTEROVIRUS: NORMAL
GFR AFRICAN AMERICAN: >60
GFR NON-AFRICAN AMERICAN: >60 ML/MIN/1.73
GLUCOSE BLD-MCNC: 184 MG/DL (ref 74–109)
HCT VFR BLD CALC: 32.9 % (ref 34–48)
HEMOGLOBIN: 10.7 G/DL (ref 11.5–15.5)
MCH RBC QN AUTO: 30.3 PG (ref 26–35)
MCHC RBC AUTO-ENTMCNC: 32.5 % (ref 32–34.5)
MCV RBC AUTO: 93.2 FL (ref 80–99.9)
METER GLUCOSE: 125 MG/DL (ref 70–110)
METER GLUCOSE: 175 MG/DL (ref 70–110)
PDW BLD-RTO: 12.7 FL (ref 11.5–15)
PLATELET # BLD: 143 E9/L (ref 130–450)
PMV BLD AUTO: 10 FL (ref 7–12)
POTASSIUM SERPL-SCNC: 4.3 MMOL/L (ref 3.5–5)
RBC # BLD: 3.53 E12/L (ref 3.5–5.5)
SODIUM BLD-SCNC: 139 MMOL/L (ref 132–146)
TOTAL PROTEIN: 6.2 G/DL (ref 6.4–8.3)
WBC # BLD: 9.4 E9/L (ref 4.5–11.5)

## 2018-10-03 PROCEDURE — 36415 COLL VENOUS BLD VENIPUNCTURE: CPT

## 2018-10-03 PROCEDURE — 6370000000 HC RX 637 (ALT 250 FOR IP): Performed by: INTERNAL MEDICINE

## 2018-10-03 PROCEDURE — 2700000000 HC OXYGEN THERAPY PER DAY

## 2018-10-03 PROCEDURE — 2580000003 HC RX 258: Performed by: INTERNAL MEDICINE

## 2018-10-03 PROCEDURE — 71046 X-RAY EXAM CHEST 2 VIEWS: CPT

## 2018-10-03 PROCEDURE — 82962 GLUCOSE BLOOD TEST: CPT

## 2018-10-03 PROCEDURE — 80053 COMPREHEN METABOLIC PANEL: CPT

## 2018-10-03 PROCEDURE — 2060000000 HC ICU INTERMEDIATE R&B

## 2018-10-03 PROCEDURE — 6360000002 HC RX W HCPCS: Performed by: INTERNAL MEDICINE

## 2018-10-03 PROCEDURE — 85027 COMPLETE CBC AUTOMATED: CPT

## 2018-10-03 RX ADMIN — OYSTER SHELL CALCIUM WITH VITAMIN D 1 TABLET: 500; 200 TABLET, FILM COATED ORAL at 01:21

## 2018-10-03 RX ADMIN — ASPIRIN 81 MG: 81 TABLET, DELAYED RELEASE ORAL at 20:36

## 2018-10-03 RX ADMIN — GLIMEPIRIDE 4 MG: 4 TABLET ORAL at 08:43

## 2018-10-03 RX ADMIN — PAROXETINE HYDROCHLORIDE 30 MG: 20 TABLET, FILM COATED ORAL at 01:21

## 2018-10-03 RX ADMIN — ACETAMINOPHEN 500 MG: 500 TABLET, FILM COATED ORAL at 12:01

## 2018-10-03 RX ADMIN — SIMVASTATIN 20 MG: 20 TABLET, FILM COATED ORAL at 20:37

## 2018-10-03 RX ADMIN — MULTIPLE VITAMINS W/ MINERALS TAB 1 TABLET: TAB at 12:01

## 2018-10-03 RX ADMIN — LINAGLIPTIN 5 MG: 5 TABLET, FILM COATED ORAL at 01:21

## 2018-10-03 RX ADMIN — ASPIRIN 81 MG: 81 TABLET, DELAYED RELEASE ORAL at 01:21

## 2018-10-03 RX ADMIN — ACETAMINOPHEN 500 MG: 500 TABLET, FILM COATED ORAL at 01:21

## 2018-10-03 RX ADMIN — ATENOLOL 25 MG: 25 TABLET ORAL at 20:36

## 2018-10-03 RX ADMIN — SODIUM CHLORIDE: 4.5 INJECTION, SOLUTION INTRAVENOUS at 01:29

## 2018-10-03 RX ADMIN — SODIUM CHLORIDE 1 G: 9 INJECTION INTRAMUSCULAR; INTRAVENOUS; SUBCUTANEOUS at 20:35

## 2018-10-03 RX ADMIN — PAROXETINE HYDROCHLORIDE 30 MG: 20 TABLET, FILM COATED ORAL at 20:35

## 2018-10-03 RX ADMIN — SODIUM CHLORIDE: 4.5 INJECTION, SOLUTION INTRAVENOUS at 12:01

## 2018-10-03 RX ADMIN — GLIMEPIRIDE 4 MG: 4 TABLET ORAL at 18:03

## 2018-10-03 RX ADMIN — ATENOLOL 25 MG: 25 TABLET ORAL at 08:43

## 2018-10-03 RX ADMIN — LINAGLIPTIN 5 MG: 5 TABLET, FILM COATED ORAL at 18:03

## 2018-10-03 RX ADMIN — OYSTER SHELL CALCIUM WITH VITAMIN D 1 TABLET: 500; 200 TABLET, FILM COATED ORAL at 18:03

## 2018-10-03 RX ADMIN — LOSARTAN POTASSIUM 100 MG: 50 TABLET ORAL at 20:35

## 2018-10-03 ASSESSMENT — PAIN SCALES - GENERAL
PAINLEVEL_OUTOF10: 0

## 2018-10-03 NOTE — H&P
positive leukocyte esterase. She was given  Rocephin in the ER 1 gm and she was given doxycycline IV, not sure why. The chest x-ray was read portable negative and it was portable again. The  ER physician and the resident felt they saw abnormality and they wanted to  cover her for pneumonia, although I doubt the diagnosis. Her EKG normal  sinus rhythm, but did show left axis deviation with mild poor R-wave  progression. Her CT of the head other than moderate diffuse atrophy and  chronic microvascular ischemic changes was negative for any acute bleed or  masses. CT of the abdomen and pelvis with IV contrast was obtained, most  likely due to the flank pain and showed no abnormality whatsoever. Seeing  the patient today, she feels 80% better, pleasant, answers questions and  smiling. MEDICATIONS:  Her meds were reviewed and all renewed. SOCIAL HISTORY:  Lives with her daughter. No smoke, no alcohol, no drugs. PAST SURGICAL HISTORY:  Joint replacement. FAMILY HISTORY:  Noncontributory. ALLERGIES:  SULFA. REVIEW OF SYSTEMS:  Otherwise, denied any hemoptysis, hematemesis,  hematochezia. Currently, she denies any cough or any abdominal pain. PHYSICAL EXAMINATION:  GENERAL:  Not in any distress, pleasant, alert, awake, disoriented, but  answers question appropriately and follows commands. VITAL SIGNS:  Temp 98.8, heart rate 68, pulse 16, blood pressure 115/54,  pulse ox 95% on 1 L of nasal cannula. SKIN:  Normal.  HEENT:  Within normal limits. No oral exudates. NECK:  No lymphadenopathy. No JVD. HEART:  No rubs, murmurs, or gallops noted. LUNGS:  Clear. No wheezing. No egophony. ABDOMEN:  Soft, nontender. No masses. No rebound or guarding. EXTREMITIES:  No edema. No cyanosis. No calf tenderness. Distal pulses  palpable. NEUROLOGIC:  Again follows commands. Normal speech. Normal motor  strength. ASSESSMENT:  1.   Acute altered mental status, most likely due to #2.  2.  Acute

## 2018-10-04 PROBLEM — R31.9 URINARY TRACT INFECTION WITH HEMATURIA: Status: ACTIVE | Noted: 2018-10-04

## 2018-10-04 PROBLEM — R41.82 ALTERED MENTAL STATUS: Status: ACTIVE | Noted: 2018-10-04

## 2018-10-04 PROBLEM — E86.0 DEHYDRATION: Status: ACTIVE | Noted: 2018-10-04

## 2018-10-04 PROBLEM — R50.9 FEVER: Status: ACTIVE | Noted: 2018-10-04

## 2018-10-04 PROBLEM — N39.0 URINARY TRACT INFECTION WITH HEMATURIA: Status: ACTIVE | Noted: 2018-10-04

## 2018-10-04 PROBLEM — I95.9 HYPOTENSION: Status: ACTIVE | Noted: 2018-10-04

## 2018-10-04 LAB
METER GLUCOSE: 108 MG/DL (ref 70–110)
METER GLUCOSE: 171 MG/DL (ref 70–110)
METER GLUCOSE: 68 MG/DL (ref 70–110)
METER GLUCOSE: 80 MG/DL (ref 70–110)

## 2018-10-04 PROCEDURE — 2700000000 HC OXYGEN THERAPY PER DAY

## 2018-10-04 PROCEDURE — 2060000000 HC ICU INTERMEDIATE R&B

## 2018-10-04 PROCEDURE — 6360000002 HC RX W HCPCS: Performed by: INTERNAL MEDICINE

## 2018-10-04 PROCEDURE — 6370000000 HC RX 637 (ALT 250 FOR IP): Performed by: INTERNAL MEDICINE

## 2018-10-04 PROCEDURE — 2580000003 HC RX 258: Performed by: INTERNAL MEDICINE

## 2018-10-04 PROCEDURE — 97161 PT EVAL LOW COMPLEX 20 MIN: CPT | Performed by: PHYSICAL THERAPIST

## 2018-10-04 PROCEDURE — 82962 GLUCOSE BLOOD TEST: CPT

## 2018-10-04 PROCEDURE — G8978 MOBILITY CURRENT STATUS: HCPCS | Performed by: PHYSICAL THERAPIST

## 2018-10-04 PROCEDURE — G8979 MOBILITY GOAL STATUS: HCPCS | Performed by: PHYSICAL THERAPIST

## 2018-10-04 PROCEDURE — 97530 THERAPEUTIC ACTIVITIES: CPT | Performed by: PHYSICAL THERAPIST

## 2018-10-04 RX ORDER — ACETAMINOPHEN 325 MG/1
650 TABLET ORAL EVERY 6 HOURS PRN
Status: DISCONTINUED | OUTPATIENT
Start: 2018-10-04 | End: 2018-10-06 | Stop reason: HOSPADM

## 2018-10-04 RX ADMIN — ACETAMINOPHEN 500 MG: 500 TABLET, FILM COATED ORAL at 12:22

## 2018-10-04 RX ADMIN — MULTIPLE VITAMINS W/ MINERALS TAB 1 TABLET: TAB at 12:22

## 2018-10-04 RX ADMIN — ACETAMINOPHEN 650 MG: 325 TABLET, FILM COATED ORAL at 18:21

## 2018-10-04 RX ADMIN — LOSARTAN POTASSIUM 100 MG: 50 TABLET ORAL at 18:17

## 2018-10-04 RX ADMIN — ATENOLOL 25 MG: 25 TABLET ORAL at 20:55

## 2018-10-04 RX ADMIN — OYSTER SHELL CALCIUM WITH VITAMIN D 1 TABLET: 500; 200 TABLET, FILM COATED ORAL at 18:18

## 2018-10-04 RX ADMIN — AMLODIPINE BESYLATE 2.5 MG: 2.5 TABLET ORAL at 18:17

## 2018-10-04 RX ADMIN — SODIUM CHLORIDE: 4.5 INJECTION, SOLUTION INTRAVENOUS at 01:05

## 2018-10-04 RX ADMIN — SODIUM CHLORIDE: 4.5 INJECTION, SOLUTION INTRAVENOUS at 12:23

## 2018-10-04 RX ADMIN — PAROXETINE HYDROCHLORIDE 30 MG: 20 TABLET, FILM COATED ORAL at 18:19

## 2018-10-04 RX ADMIN — SIMVASTATIN 20 MG: 20 TABLET, FILM COATED ORAL at 20:52

## 2018-10-04 RX ADMIN — LINAGLIPTIN 5 MG: 5 TABLET, FILM COATED ORAL at 18:18

## 2018-10-04 RX ADMIN — ASPIRIN 81 MG: 81 TABLET, DELAYED RELEASE ORAL at 20:52

## 2018-10-04 RX ADMIN — SODIUM CHLORIDE 1 G: 9 INJECTION INTRAMUSCULAR; INTRAVENOUS; SUBCUTANEOUS at 20:49

## 2018-10-04 ASSESSMENT — PAIN SCALES - GENERAL
PAINLEVEL_OUTOF10: 0
PAINLEVEL_OUTOF10: 5
PAINLEVEL_OUTOF10: 0

## 2018-10-04 ASSESSMENT — PAIN DESCRIPTION - FREQUENCY: FREQUENCY: INTERMITTENT

## 2018-10-04 ASSESSMENT — PAIN DESCRIPTION - LOCATION: LOCATION: ABDOMEN

## 2018-10-04 ASSESSMENT — PAIN DESCRIPTION - ORIENTATION: ORIENTATION: MID

## 2018-10-04 ASSESSMENT — PAIN DESCRIPTION - PAIN TYPE: TYPE: ACUTE PAIN

## 2018-10-04 ASSESSMENT — PAIN DESCRIPTION - ONSET: ONSET: ON-GOING

## 2018-10-04 ASSESSMENT — PAIN DESCRIPTION - DESCRIPTORS: DESCRIPTORS: ACHING;DISCOMFORT

## 2018-10-04 NOTE — PLAN OF CARE
Problem: Falls - Risk of:  Goal: Absence of physical injury  Absence of physical injury     Outcome: Met This Shift      Problem: Injury - Risk of, Physical Injury:  Goal: Absence of physical injury  Absence of physical injury     Outcome: Met This Shift

## 2018-10-04 NOTE — PROGRESS NOTES
Room #:   0630/0630-01  Patient Name: Kehinde Klein    PHYSICAL THERAPY INITIAL EVALUATION    Tentative placement recommendation: subacute vs HHPT with 24/7 if goals met  Equipment recommendation: To be determined      Plan of care: Patient will be seen   daily Monday-Friday for therapeutic exercise, functional retraining, endurance activities, balance exercises, family and patient education. AM-PAC Basic Mobility        AM-PAC Mobility Inpatient   How much difficulty turning over in bed?: A Little  How much difficulty sitting down on / standing up from a chair with arms?: A Little  How much difficulty moving from lying on back to sitting on side of bed?: A Little  How much help from another person moving to and from a bed to a chair?: A Little  How much help from another person needed to walk in hospital room?: A Little  How much help from another person for climbing 3-5 steps with a railing?: A Lot  AM-PAC Inpatient Mobility Raw Score : 17  AM-PAC Inpatient T-Scale Score : 42.13  Mobility Inpatient CMS 0-100% Score: 50.57  Mobility Inpatient CMS G-Code Modifier : CK    Order:  EVALUATE AND TREAT    Diagnosis/Problem list:    1. Sepsis, due to unspecified organism ([address]    2. Urinary tract infection without hematuria, site unspecified    3. Pneumonia due to organism    4. Lethargy    5. Hypoxia        Patient Active Problem List   Diagnosis    Diabetes mellitus ([address]    HTN (hypertension)    Sepsis ([address]    Urinary tract infection with hematuria    Dehydration    Hypotension    Altered mental status    Fever       Past medical history:       Diagnosis Date    Dementia     Diabetes mellitus ([address]     Hypertension     Tachycardia     TIA (transient ischemic attack)    ; Procedure Laterality Date    JOINT REPLACEMENT         The admitting diagnosis and active problem list as listed above have been reviewed prior to the initiation of this evaluation.   Last time out of bed:

## 2018-10-05 LAB
METER GLUCOSE: 101 MG/DL (ref 70–110)
METER GLUCOSE: 151 MG/DL (ref 70–110)
ORGANISM: ABNORMAL
URINE CULTURE, ROUTINE: ABNORMAL
URINE CULTURE, ROUTINE: ABNORMAL

## 2018-10-05 PROCEDURE — 2580000003 HC RX 258: Performed by: INTERNAL MEDICINE

## 2018-10-05 PROCEDURE — 6360000002 HC RX W HCPCS: Performed by: INTERNAL MEDICINE

## 2018-10-05 PROCEDURE — 82962 GLUCOSE BLOOD TEST: CPT

## 2018-10-05 PROCEDURE — 6370000000 HC RX 637 (ALT 250 FOR IP): Performed by: INTERNAL MEDICINE

## 2018-10-05 PROCEDURE — 2060000000 HC ICU INTERMEDIATE R&B

## 2018-10-05 RX ADMIN — LINAGLIPTIN 5 MG: 5 TABLET, FILM COATED ORAL at 18:12

## 2018-10-05 RX ADMIN — SODIUM CHLORIDE 1 G: 9 INJECTION INTRAMUSCULAR; INTRAVENOUS; SUBCUTANEOUS at 20:32

## 2018-10-05 RX ADMIN — SODIUM CHLORIDE: 4.5 INJECTION, SOLUTION INTRAVENOUS at 20:32

## 2018-10-05 RX ADMIN — SODIUM CHLORIDE: 4.5 INJECTION, SOLUTION INTRAVENOUS at 03:55

## 2018-10-05 RX ADMIN — ACETAMINOPHEN 500 MG: 500 TABLET, FILM COATED ORAL at 11:54

## 2018-10-05 RX ADMIN — SIMVASTATIN 20 MG: 20 TABLET, FILM COATED ORAL at 20:31

## 2018-10-05 RX ADMIN — ATENOLOL 25 MG: 25 TABLET ORAL at 08:36

## 2018-10-05 RX ADMIN — PAROXETINE HYDROCHLORIDE 30 MG: 20 TABLET, FILM COATED ORAL at 18:12

## 2018-10-05 RX ADMIN — MULTIPLE VITAMINS W/ MINERALS TAB 1 TABLET: TAB at 11:54

## 2018-10-05 RX ADMIN — AMLODIPINE BESYLATE 2.5 MG: 2.5 TABLET ORAL at 18:12

## 2018-10-05 RX ADMIN — ATENOLOL 25 MG: 25 TABLET ORAL at 20:31

## 2018-10-05 RX ADMIN — ASPIRIN 81 MG: 81 TABLET, DELAYED RELEASE ORAL at 20:31

## 2018-10-05 RX ADMIN — OYSTER SHELL CALCIUM WITH VITAMIN D 1 TABLET: 500; 200 TABLET, FILM COATED ORAL at 18:13

## 2018-10-05 RX ADMIN — LOSARTAN POTASSIUM 100 MG: 50 TABLET ORAL at 18:13

## 2018-10-05 ASSESSMENT — PAIN SCALES - GENERAL
PAINLEVEL_OUTOF10: 0

## 2018-10-05 NOTE — PLAN OF CARE
Problem: Falls - Risk of:  Goal: Will remain free from falls  Will remain free from falls     Outcome: Ongoing    Goal: Absence of physical injury  Absence of physical injury     Outcome: Ongoing      Problem: Confusion - Acute:  Goal: Absence of continued neurological deterioration signs and symptoms  Absence of continued neurological deterioration signs and symptoms    Outcome: Ongoing    Goal: Mental status will be restored to baseline  Mental status will be restored to baseline    Outcome: Ongoing      Problem: Discharge Planning:  Goal: Ability to perform activities of daily living will improve  Ability to perform activities of daily living will improve    Outcome: Ongoing    Goal: Participates in care planning  Participates in care planning    Outcome: Ongoing      Problem: Injury - Risk of, Physical Injury:  Goal: Absence of physical injury  Absence of physical injury     Outcome: Ongoing    Goal: Will remain free from falls  Will remain free from falls     Outcome: Ongoing      Problem: Mood - Altered:  Goal: Mood stable  Mood stable    Outcome: Ongoing    Goal: Absence of abusive behavior  Absence of abusive behavior    Outcome: Ongoing    Goal: Verbalizations of feeling emotionally comfortable while being cared for will increase  Verbalizations of feeling emotionally comfortable while being cared for will increase    Outcome: Ongoing      Problem: Psychomotor Activity - Altered:  Goal: Absence of psychomotor disturbance signs and symptoms  Absence of psychomotor disturbance signs and symptoms    Outcome: Ongoing      Problem: Sensory Perception - Impaired:  Goal: Demonstrations of improved sensory functioning will increase  Demonstrations of improved sensory functioning will increase    Outcome: Ongoing    Goal: Decrease in sensory misperception frequency  Decrease in sensory misperception frequency    Outcome: Ongoing    Goal: Able to refrain from responding to false sensory perceptions  Able to refrain from responding to false sensory perceptions    Outcome: Ongoing    Goal: Demonstrates accurate environmental perceptions  Demonstrates accurate environmental perceptions    Outcome: Ongoing    Goal: Able to distinguish between reality-based and nonreality-based thinking  Able to distinguish between reality-based and nonreality-based thinking    Outcome: Ongoing    Goal: Able to interrupt nonreality-based thinking  Able to interrupt nonreality-based thinking    Outcome: Ongoing      Problem: Sleep Pattern Disturbance:  Goal: Appears well-rested  Appears well-rested    Outcome: Ongoing

## 2018-10-06 VITALS
BODY MASS INDEX: 24.27 KG/M2 | RESPIRATION RATE: 17 BRPM | DIASTOLIC BLOOD PRESSURE: 53 MMHG | SYSTOLIC BLOOD PRESSURE: 105 MMHG | OXYGEN SATURATION: 92 % | WEIGHT: 137 LBS | HEIGHT: 63 IN | TEMPERATURE: 99.4 F | HEART RATE: 72 BPM

## 2018-10-06 PROBLEM — N39.0 URINARY TRACT INFECTION WITH HEMATURIA: Status: RESOLVED | Noted: 2018-10-04 | Resolved: 2018-10-06

## 2018-10-06 PROBLEM — R31.9 URINARY TRACT INFECTION WITH HEMATURIA: Status: RESOLVED | Noted: 2018-10-04 | Resolved: 2018-10-06

## 2018-10-06 LAB — METER GLUCOSE: 137 MG/DL (ref 70–110)

## 2018-10-06 PROCEDURE — 82962 GLUCOSE BLOOD TEST: CPT

## 2018-10-06 PROCEDURE — 6370000000 HC RX 637 (ALT 250 FOR IP): Performed by: INTERNAL MEDICINE

## 2018-10-06 RX ORDER — OYSTER SHELL CALCIUM WITH VITAMIN D 500; 200 MG/1; [IU]/1
1 TABLET, FILM COATED ORAL EVERY EVENING
Qty: 30 TABLET | Refills: 3 | Status: SHIPPED | OUTPATIENT
Start: 2018-10-06 | End: 2018-11-15 | Stop reason: ALTCHOICE

## 2018-10-06 RX ORDER — CEPHALEXIN 250 MG/1
500 CAPSULE ORAL 3 TIMES DAILY
Qty: 60 CAPSULE | Refills: 0 | Status: SHIPPED | OUTPATIENT
Start: 2018-10-06 | End: 2018-10-16

## 2018-10-06 RX ORDER — SIMVASTATIN 20 MG
20 TABLET ORAL NIGHTLY
Qty: 30 TABLET | Refills: 3 | Status: SHIPPED | OUTPATIENT
Start: 2018-10-06

## 2018-10-06 RX ADMIN — PAROXETINE HYDROCHLORIDE 30 MG: 20 TABLET, FILM COATED ORAL at 18:28

## 2018-10-06 RX ADMIN — ATENOLOL 25 MG: 25 TABLET ORAL at 09:17

## 2018-10-06 RX ADMIN — LINAGLIPTIN 5 MG: 5 TABLET, FILM COATED ORAL at 18:28

## 2018-10-06 RX ADMIN — MULTIPLE VITAMINS W/ MINERALS TAB 1 TABLET: TAB at 11:57

## 2018-10-06 RX ADMIN — OYSTER SHELL CALCIUM WITH VITAMIN D 1 TABLET: 500; 200 TABLET, FILM COATED ORAL at 18:28

## 2018-10-06 RX ADMIN — LOSARTAN POTASSIUM 100 MG: 50 TABLET ORAL at 18:28

## 2018-10-06 RX ADMIN — ACETAMINOPHEN 500 MG: 500 TABLET, FILM COATED ORAL at 11:57

## 2018-10-06 ASSESSMENT — PAIN SCALES - GENERAL
PAINLEVEL_OUTOF10: 4
PAINLEVEL_OUTOF10: 0
PAINLEVEL_OUTOF10: 0

## 2018-10-06 NOTE — DISCHARGE SUMMARY
acarbose (PRECOSE) 25 MG tablet Take 25 mg by mouth 3 times daily (with meals)      losartan (COZAAR) 100 MG tablet Take 100 mg by mouth every evening       aspirin 81 MG EC tablet Take 81 mg by mouth nightly       atenolol (TENORMIN) 25 MG tablet Take 25 mg by mouth 2 times daily. lovastatin (MEVACOR) 40 MG tablet Take 40 mg by mouth every evening       paroxetine (PAXIL) 30 MG tablet Take 30 mg by mouth every evening       fish oil-omega-3 fatty acids 1000 MG capsule Take 1 g by mouth Daily with lunch       meclizine (ANTIVERT) 25 MG tablet Take 1 to 2 every 8 hours as needed for dizziness. Qty: 20 tablet, Refills: 0                        Current Discharge Medication List      STOP taking these medications       glimepiride (AMARYL) 4 MG tablet Comments:   Reason for Stopping:         Calcium Carb-Cholecalciferol (CALCIUM 500 + D) 500-400 MG-UNIT TABS Comments:   Reason for Stopping:         amlodipine (NORVASC) 5 MG tablet Comments:   Reason for Stopping:                    Disposition:SNF at [facility] in office in next 2-4 weeks.     Note that over 30 minutes was spent in preparing discharge papers, discussing discharge with patient, medication review, etc.      Signed:  Electronically signed by Deborah Hong MD on 10/6/2018 at 2:56 PM

## 2018-10-07 LAB
BLOOD CULTURE, ROUTINE: NORMAL
CULTURE, BLOOD 2: NORMAL

## 2018-11-03 PROBLEM — E86.0 DEHYDRATION: Status: RESOLVED | Noted: 2018-10-04 | Resolved: 2018-11-03

## 2018-11-15 ENCOUNTER — HOSPITAL ENCOUNTER (OUTPATIENT)
Age: 83
Setting detail: OBSERVATION
Discharge: HOME | End: 2018-11-18
Attending: EMERGENCY MEDICINE | Admitting: INTERNAL MEDICINE
Payer: MEDICARE

## 2018-11-15 ENCOUNTER — APPOINTMENT (OUTPATIENT)
Dept: CT IMAGING | Age: 83
End: 2018-11-15
Payer: MEDICARE

## 2018-11-15 ENCOUNTER — APPOINTMENT (OUTPATIENT)
Dept: GENERAL RADIOLOGY | Age: 83
End: 2018-11-15
Payer: MEDICARE

## 2018-11-15 DIAGNOSIS — R41.82 ALTERED MENTAL STATUS, UNSPECIFIED ALTERED MENTAL STATUS TYPE: Primary | ICD-10-CM

## 2018-11-15 LAB
ALBUMIN SERPL-MCNC: 4.4 G/DL (ref 3.5–5.2)
ALP BLD-CCNC: 56 U/L (ref 35–104)
ALT SERPL-CCNC: 12 U/L (ref 0–32)
ANION GAP SERPL CALCULATED.3IONS-SCNC: 13 MMOL/L (ref 7–16)
APTT: 25.4 SEC (ref 24.5–35.1)
AST SERPL-CCNC: 16 U/L (ref 0–31)
BACTERIA: ABNORMAL /HPF
BASOPHILS ABSOLUTE: 0.01 E9/L (ref 0–0.2)
BASOPHILS RELATIVE PERCENT: 0.2 % (ref 0–2)
BILIRUB SERPL-MCNC: 0.4 MG/DL (ref 0–1.2)
BILIRUBIN URINE: NEGATIVE
BLOOD, URINE: ABNORMAL
BUN BLDV-MCNC: 19 MG/DL (ref 8–23)
CALCIUM SERPL-MCNC: 9.5 MG/DL (ref 8.6–10.2)
CHLORIDE BLD-SCNC: 104 MMOL/L (ref 98–107)
CLARITY: CLEAR
CO2: 27 MMOL/L (ref 22–29)
COLOR: YELLOW
CREAT SERPL-MCNC: 0.7 MG/DL (ref 0.5–1)
EKG ATRIAL RATE: 73 BPM
EKG P AXIS: 22 DEGREES
EKG P-R INTERVAL: 134 MS
EKG Q-T INTERVAL: 410 MS
EKG QRS DURATION: 92 MS
EKG QTC CALCULATION (BAZETT): 451 MS
EKG R AXIS: -55 DEGREES
EKG T AXIS: 71 DEGREES
EKG VENTRICULAR RATE: 73 BPM
EOSINOPHILS ABSOLUTE: 0.06 E9/L (ref 0.05–0.5)
EOSINOPHILS RELATIVE PERCENT: 1 % (ref 0–6)
EPITHELIAL CELLS, UA: ABNORMAL /HPF
GFR AFRICAN AMERICAN: >60
GFR NON-AFRICAN AMERICAN: >60 ML/MIN/1.73
GLUCOSE BLD-MCNC: 162 MG/DL (ref 74–99)
GLUCOSE URINE: >=1000 MG/DL
HCT VFR BLD CALC: 46.4 % (ref 34–48)
HEMOGLOBIN: 14.9 G/DL (ref 11.5–15.5)
IMMATURE GRANULOCYTES #: 0.02 E9/L
IMMATURE GRANULOCYTES %: 0.3 % (ref 0–5)
INR BLD: 1
KETONES, URINE: ABNORMAL MG/DL
LACTIC ACID: 1.3 MMOL/L (ref 0.5–2.2)
LEUKOCYTE ESTERASE, URINE: NEGATIVE
LYMPHOCYTES ABSOLUTE: 1.91 E9/L (ref 1.5–4)
LYMPHOCYTES RELATIVE PERCENT: 30.6 % (ref 20–42)
MCH RBC QN AUTO: 29.6 PG (ref 26–35)
MCHC RBC AUTO-ENTMCNC: 32.1 % (ref 32–34.5)
MCV RBC AUTO: 92.1 FL (ref 80–99.9)
MONOCYTES ABSOLUTE: 0.65 E9/L (ref 0.1–0.95)
MONOCYTES RELATIVE PERCENT: 10.4 % (ref 2–12)
NEUTROPHILS ABSOLUTE: 3.59 E9/L (ref 1.8–7.3)
NEUTROPHILS RELATIVE PERCENT: 57.5 % (ref 43–80)
NITRITE, URINE: NEGATIVE
PDW BLD-RTO: 13 FL (ref 11.5–15)
PH UA: 5.5 (ref 5–9)
PLATELET # BLD: 187 E9/L (ref 130–450)
PMV BLD AUTO: 9.9 FL (ref 7–12)
POTASSIUM SERPL-SCNC: 4 MMOL/L (ref 3.5–5)
PROTEIN UA: NEGATIVE MG/DL
PROTHROMBIN TIME: 10.9 SEC (ref 9.3–12.4)
RBC # BLD: 5.04 E12/L (ref 3.5–5.5)
RBC UA: ABNORMAL /HPF (ref 0–2)
SODIUM BLD-SCNC: 144 MMOL/L (ref 132–146)
SPECIFIC GRAVITY UA: 1.02 (ref 1–1.03)
TOTAL PROTEIN: 7.6 G/DL (ref 6.4–8.3)
TROPONIN: <0.01 NG/ML (ref 0–0.03)
UROBILINOGEN, URINE: 0.2 E.U./DL
WBC # BLD: 6.2 E9/L (ref 4.5–11.5)
WBC UA: ABNORMAL /HPF (ref 0–5)
YEAST: ABNORMAL

## 2018-11-15 PROCEDURE — 85730 THROMBOPLASTIN TIME PARTIAL: CPT

## 2018-11-15 PROCEDURE — 85610 PROTHROMBIN TIME: CPT

## 2018-11-15 PROCEDURE — 85025 COMPLETE CBC W/AUTO DIFF WBC: CPT

## 2018-11-15 PROCEDURE — 84484 ASSAY OF TROPONIN QUANT: CPT

## 2018-11-15 PROCEDURE — 70450 CT HEAD/BRAIN W/O DYE: CPT

## 2018-11-15 PROCEDURE — 6370000000 HC RX 637 (ALT 250 FOR IP): Performed by: INTERNAL MEDICINE

## 2018-11-15 PROCEDURE — 99285 EMERGENCY DEPT VISIT HI MDM: CPT

## 2018-11-15 PROCEDURE — G0378 HOSPITAL OBSERVATION PER HR: HCPCS

## 2018-11-15 PROCEDURE — 83605 ASSAY OF LACTIC ACID: CPT

## 2018-11-15 PROCEDURE — 81001 URINALYSIS AUTO W/SCOPE: CPT

## 2018-11-15 PROCEDURE — 93005 ELECTROCARDIOGRAM TRACING: CPT

## 2018-11-15 PROCEDURE — 87088 URINE BACTERIA CULTURE: CPT

## 2018-11-15 PROCEDURE — 80053 COMPREHEN METABOLIC PANEL: CPT

## 2018-11-15 PROCEDURE — 71045 X-RAY EXAM CHEST 1 VIEW: CPT

## 2018-11-15 PROCEDURE — 36415 COLL VENOUS BLD VENIPUNCTURE: CPT

## 2018-11-15 RX ORDER — OMEGA-3/DHA/EPA/FISH OIL 300-1000MG
1 CAPSULE ORAL DAILY
Status: DISCONTINUED | OUTPATIENT
Start: 2018-11-16 | End: 2018-11-15 | Stop reason: CLARIF

## 2018-11-15 RX ORDER — SODIUM CHLORIDE 0.9 % (FLUSH) 0.9 %
SYRINGE (ML) INJECTION
Status: DISPENSED
Start: 2018-11-15 | End: 2018-11-16

## 2018-11-15 RX ORDER — ACARBOSE 25 MG/1
25 TABLET ORAL
Status: DISCONTINUED | OUTPATIENT
Start: 2018-11-16 | End: 2018-11-17 | Stop reason: CLARIF

## 2018-11-15 RX ORDER — ACETAMINOPHEN 500 MG
500 TABLET ORAL 2 TIMES DAILY
Status: DISCONTINUED | OUTPATIENT
Start: 2018-11-15 | End: 2018-11-18 | Stop reason: HOSPADM

## 2018-11-15 RX ORDER — RIVASTIGMINE 9.5 MG/24H
1 PATCH, EXTENDED RELEASE TRANSDERMAL DAILY
Status: DISCONTINUED | OUTPATIENT
Start: 2018-11-16 | End: 2018-11-18 | Stop reason: HOSPADM

## 2018-11-15 RX ORDER — MELOXICAM 7.5 MG/1
7.5 TABLET ORAL DAILY
Status: DISCONTINUED | OUTPATIENT
Start: 2018-11-16 | End: 2018-11-18 | Stop reason: HOSPADM

## 2018-11-15 RX ORDER — ASPIRIN 81 MG/1
81 TABLET ORAL DAILY
Status: DISCONTINUED | OUTPATIENT
Start: 2018-11-16 | End: 2018-11-18 | Stop reason: HOSPADM

## 2018-11-15 RX ORDER — ACETAMINOPHEN 325 MG/1
650 TABLET ORAL EVERY 6 HOURS PRN
COMMUNITY
End: 2019-08-02 | Stop reason: ALTCHOICE

## 2018-11-15 RX ORDER — LIDOCAINE 50 MG/G
1 PATCH TOPICAL 2 TIMES DAILY
COMMUNITY
End: 2021-11-29

## 2018-11-15 RX ORDER — SIMVASTATIN 20 MG
20 TABLET ORAL DAILY
Status: DISCONTINUED | OUTPATIENT
Start: 2018-11-16 | End: 2018-11-18 | Stop reason: HOSPADM

## 2018-11-15 RX ORDER — OYSTER SHELL CALCIUM WITH VITAMIN D 500; 200 MG/1; [IU]/1
1 TABLET, FILM COATED ORAL DAILY
Status: DISCONTINUED | OUTPATIENT
Start: 2018-11-16 | End: 2018-11-18 | Stop reason: HOSPADM

## 2018-11-15 RX ORDER — GLIMEPIRIDE 4 MG/1
4 TABLET ORAL 2 TIMES DAILY
Status: DISCONTINUED | OUTPATIENT
Start: 2018-11-15 | End: 2018-11-18 | Stop reason: HOSPADM

## 2018-11-15 RX ORDER — SENNA PLUS 8.6 MG/1
1 TABLET ORAL DAILY
Status: DISCONTINUED | OUTPATIENT
Start: 2018-11-16 | End: 2018-11-18 | Stop reason: HOSPADM

## 2018-11-15 RX ORDER — LIDOCAINE 4 G/G
1 PATCH TOPICAL DAILY
Status: DISCONTINUED | OUTPATIENT
Start: 2018-11-16 | End: 2018-11-18 | Stop reason: HOSPADM

## 2018-11-15 RX ORDER — ACETAMINOPHEN 325 MG/1
650 TABLET ORAL EVERY 6 HOURS PRN
Status: DISCONTINUED | OUTPATIENT
Start: 2018-11-15 | End: 2018-11-18 | Stop reason: HOSPADM

## 2018-11-15 RX ORDER — MELOXICAM 7.5 MG/1
7.5 TABLET ORAL DAILY
COMMUNITY

## 2018-11-15 RX ORDER — MECLIZINE HCL 12.5 MG/1
25 TABLET ORAL 3 TIMES DAILY PRN
Status: DISCONTINUED | OUTPATIENT
Start: 2018-11-15 | End: 2018-11-18 | Stop reason: HOSPADM

## 2018-11-15 RX ORDER — ATENOLOL 25 MG/1
25 TABLET ORAL 2 TIMES DAILY
Status: DISCONTINUED | OUTPATIENT
Start: 2018-11-15 | End: 2018-11-18 | Stop reason: HOSPADM

## 2018-11-15 RX ORDER — SENNA PLUS 8.6 MG/1
1 TABLET ORAL DAILY
COMMUNITY

## 2018-11-15 RX ORDER — LOSARTAN POTASSIUM 50 MG/1
100 TABLET ORAL DAILY
Status: DISCONTINUED | OUTPATIENT
Start: 2018-11-16 | End: 2018-11-18 | Stop reason: HOSPADM

## 2018-11-15 RX ORDER — M-VIT,TX,IRON,MINS/CALC/FOLIC 27MG-0.4MG
1 TABLET ORAL DAILY
Status: DISCONTINUED | OUTPATIENT
Start: 2018-11-16 | End: 2018-11-18 | Stop reason: HOSPADM

## 2018-11-15 RX ORDER — GLIMEPIRIDE 4 MG/1
4 TABLET ORAL 2 TIMES DAILY
COMMUNITY

## 2018-11-15 RX ADMIN — ACETAMINOPHEN 500 MG: 500 TABLET ORAL at 22:23

## 2018-11-15 RX ADMIN — GLIMEPIRIDE 4 MG: 4 TABLET ORAL at 22:23

## 2018-11-15 RX ADMIN — ATENOLOL 25 MG: 25 TABLET ORAL at 23:02

## 2018-11-15 ASSESSMENT — PAIN SCALES - GENERAL
PAINLEVEL_OUTOF10: 2
PAINLEVEL_OUTOF10: 0

## 2018-11-15 ASSESSMENT — ENCOUNTER SYMPTOMS
CHEST TIGHTNESS: 0
NAUSEA: 0
ABDOMINAL PAIN: 0
COUGH: 0
BACK PAIN: 0
SORE THROAT: 0
DIARRHEA: 0
SHORTNESS OF BREATH: 0

## 2018-11-15 NOTE — ED PROVIDER NOTES
mg/dL   POCT Glucose   Result Value Ref Range    Meter Glucose 193 (H) 74 - 99 mg/dL   POCT Glucose   Result Value Ref Range    Meter Glucose 187 (H) 74 - 99 mg/dL   POCT Glucose   Result Value Ref Range    Meter Glucose 107 (H) 74 - 99 mg/dL   POCT Glucose   Result Value Ref Range    Meter Glucose 166 (H) 74 - 99 mg/dL   EKG 12 Lead   Result Value Ref Range    Ventricular Rate 73 BPM    Atrial Rate 73 BPM    P-R Interval 134 ms    QRS Duration 92 ms    Q-T Interval 410 ms    QTc Calculation (Bazett) 451 ms    P Axis 22 degrees    R Axis -55 degrees    T Axis 71 degrees       RADIOLOGY:  XR CERVICAL SPINE (4-5 VIEWS)   Final Result   1. Age appropriate degenerative changes otherwise unremarkable   cervical spine. .         CT Head WO Contrast   Final Result      1. No acute findings. XR CHEST 1 VW   Final Result   No acute cardiopulmonary disease.                 ------------------------- NURSING NOTES AND VITALS REVIEWED ---------------------------  Date / Time Roomed:  11/15/2018 10:52 AM  ED Bed Assignment:  0421/0421-01    The nursing notes within the ED encounter and vital signs as below have been reviewed. No data found. Oxygen Saturation Interpretation: Normal        This patient has remained hemodynamically stable during their ED course. Diagnosis:  1. Altered mental status, unspecified altered mental status type        Disposition:  Patient's disposition: Admit to telemetry  Patient's condition is stable.          Jennifer Raines DO  12/04/18 [id number]

## 2018-11-16 LAB
METER GLUCOSE: 118 MG/DL (ref 74–99)
URINE CULTURE, ROUTINE: NORMAL

## 2018-11-16 PROCEDURE — G0378 HOSPITAL OBSERVATION PER HR: HCPCS

## 2018-11-16 PROCEDURE — 82962 GLUCOSE BLOOD TEST: CPT

## 2018-11-16 PROCEDURE — 6370000000 HC RX 637 (ALT 250 FOR IP): Performed by: INTERNAL MEDICINE

## 2018-11-16 RX ORDER — DEXTROSE MONOHYDRATE 25 G/50ML
12.5 INJECTION, SOLUTION INTRAVENOUS PRN
Status: DISCONTINUED | OUTPATIENT
Start: 2018-11-16 | End: 2018-11-18 | Stop reason: HOSPADM

## 2018-11-16 RX ORDER — DEXTROSE MONOHYDRATE 50 MG/ML
100 INJECTION, SOLUTION INTRAVENOUS PRN
Status: DISCONTINUED | OUTPATIENT
Start: 2018-11-16 | End: 2018-11-18 | Stop reason: HOSPADM

## 2018-11-16 RX ORDER — NICOTINE POLACRILEX 4 MG
15 LOZENGE BUCCAL PRN
Status: DISCONTINUED | OUTPATIENT
Start: 2018-11-16 | End: 2018-11-18 | Stop reason: HOSPADM

## 2018-11-16 RX ADMIN — ATENOLOL 25 MG: 25 TABLET ORAL at 21:36

## 2018-11-16 RX ADMIN — GLIMEPIRIDE 4 MG: 4 TABLET ORAL at 21:36

## 2018-11-16 RX ADMIN — OYSTER SHELL CALCIUM WITH VITAMIN D 1 TABLET: 500; 200 TABLET, FILM COATED ORAL at 08:32

## 2018-11-16 RX ADMIN — SENNOSIDES 8.6 MG: 8.6 TABLET, FILM COATED ORAL at 08:31

## 2018-11-16 RX ADMIN — ATENOLOL 25 MG: 25 TABLET ORAL at 08:31

## 2018-11-16 RX ADMIN — ASPIRIN 81 MG: 81 TABLET ORAL at 08:31

## 2018-11-16 RX ADMIN — SIMVASTATIN 20 MG: 20 TABLET, FILM COATED ORAL at 08:32

## 2018-11-16 RX ADMIN — MULTIPLE VITAMINS W/ MINERALS TAB 1 TABLET: TAB at 08:32

## 2018-11-16 RX ADMIN — PAROXETINE HYDROCHLORIDE 30 MG: 20 TABLET, FILM COATED ORAL at 08:32

## 2018-11-16 RX ADMIN — GLIMEPIRIDE 4 MG: 4 TABLET ORAL at 08:32

## 2018-11-16 RX ADMIN — LOSARTAN POTASSIUM 100 MG: 50 TABLET ORAL at 08:32

## 2018-11-16 RX ADMIN — ACETAMINOPHEN 500 MG: 500 TABLET ORAL at 12:57

## 2018-11-16 RX ADMIN — MELOXICAM 7.5 MG: 7.5 TABLET ORAL at 08:31

## 2018-11-16 RX ADMIN — LINAGLIPTIN 5 MG: 5 TABLET, FILM COATED ORAL at 08:31

## 2018-11-16 RX ADMIN — ACETAMINOPHEN 500 MG: 500 TABLET ORAL at 21:36

## 2018-11-16 ASSESSMENT — PAIN DESCRIPTION - DESCRIPTORS: DESCRIPTORS: SORE;ACHING;DISCOMFORT

## 2018-11-16 ASSESSMENT — PAIN SCALES - GENERAL
PAINLEVEL_OUTOF10: 4
PAINLEVEL_OUTOF10: 7
PAINLEVEL_OUTOF10: 0
PAINLEVEL_OUTOF10: 0
PAINLEVEL_OUTOF10: 7

## 2018-11-16 ASSESSMENT — PAIN DESCRIPTION - LOCATION: LOCATION: EYE;HEAD

## 2018-11-16 ASSESSMENT — PAIN DESCRIPTION - FREQUENCY: FREQUENCY: CONTINUOUS

## 2018-11-16 ASSESSMENT — PAIN DESCRIPTION - ORIENTATION: ORIENTATION: ANTERIOR

## 2018-11-16 ASSESSMENT — PAIN DESCRIPTION - PAIN TYPE: TYPE: ACUTE PAIN

## 2018-11-16 NOTE — PROGRESS NOTES
P Quality Flow/Interdisciplinary Rounds Progress Note        Quality Flow Rounds held on November 16, 2018    Disciplines Attending:  Bedside Nurse, ,  and Nursing Unit Leadership    Bakari Brooks was admitted on 11/15/2018 10:52 AM    Anticipated Discharge Date:  Expected Discharge Date: 11/18/18    Disposition:    Paramjit Score:  Paramjit Scale Score: 17    Readmission Risk              Risk of Unplanned Readmission:        9           Discussed patient goal for the day, patient clinical progression, and barriers to discharge.   The following Goal(s) of the Day/Commitment(s) have been identified:  Need code, OBS, TS,       Orefield Music  November 16, 2018

## 2018-11-16 NOTE — CARE COORDINATION
Ss note: 11/16/2018.10:59 AM Pt is from [address]. Will await therapy evals to determine if pt can return to assisted living. SW will follow.  JAVIER Rosales

## 2018-11-17 ENCOUNTER — APPOINTMENT (OUTPATIENT)
Dept: GENERAL RADIOLOGY | Age: 83
End: 2018-11-17
Payer: MEDICARE

## 2018-11-17 PROBLEM — R31.9 URINARY TRACT INFECTION WITH HEMATURIA: Status: RESOLVED | Noted: 2018-10-04 | Resolved: 2018-11-17

## 2018-11-17 PROBLEM — N39.0 URINARY TRACT INFECTION WITH HEMATURIA: Status: RESOLVED | Noted: 2018-10-04 | Resolved: 2018-11-17

## 2018-11-17 PROBLEM — I95.9 HYPOTENSION: Status: RESOLVED | Noted: 2018-10-04 | Resolved: 2018-11-17

## 2018-11-17 PROBLEM — R50.9 FEVER: Status: RESOLVED | Noted: 2018-10-04 | Resolved: 2018-11-17

## 2018-11-17 PROBLEM — A41.9 SEPSIS (HCC): Status: RESOLVED | Noted: 2018-10-02 | Resolved: 2018-11-17

## 2018-11-17 LAB
METER GLUCOSE: 115 MG/DL (ref 74–99)
METER GLUCOSE: 143 MG/DL (ref 74–99)
METER GLUCOSE: 187 MG/DL (ref 74–99)
METER GLUCOSE: 193 MG/DL (ref 74–99)

## 2018-11-17 PROCEDURE — 6360000002 HC RX W HCPCS: Performed by: INTERNAL MEDICINE

## 2018-11-17 PROCEDURE — 96372 THER/PROPH/DIAG INJ SC/IM: CPT

## 2018-11-17 PROCEDURE — 97162 PT EVAL MOD COMPLEX 30 MIN: CPT | Performed by: PHYSICAL THERAPIST

## 2018-11-17 PROCEDURE — 82962 GLUCOSE BLOOD TEST: CPT

## 2018-11-17 PROCEDURE — G8979 MOBILITY GOAL STATUS: HCPCS | Performed by: PHYSICAL THERAPIST

## 2018-11-17 PROCEDURE — G0378 HOSPITAL OBSERVATION PER HR: HCPCS

## 2018-11-17 PROCEDURE — G8978 MOBILITY CURRENT STATUS: HCPCS | Performed by: PHYSICAL THERAPIST

## 2018-11-17 PROCEDURE — 6370000000 HC RX 637 (ALT 250 FOR IP): Performed by: INTERNAL MEDICINE

## 2018-11-17 PROCEDURE — 72050 X-RAY EXAM NECK SPINE 4/5VWS: CPT

## 2018-11-17 PROCEDURE — 2580000003 HC RX 258: Performed by: INTERNAL MEDICINE

## 2018-11-17 PROCEDURE — 97116 GAIT TRAINING THERAPY: CPT | Performed by: PHYSICAL THERAPIST

## 2018-11-17 RX ORDER — MUSCLE RUB CREAM 100; 150 MG/G; MG/G
CREAM TOPICAL 4 TIMES DAILY
Status: DISCONTINUED | OUTPATIENT
Start: 2018-11-17 | End: 2018-11-18 | Stop reason: HOSPADM

## 2018-11-17 RX ORDER — SODIUM CHLORIDE 9 MG/ML
INJECTION, SOLUTION INTRAVENOUS CONTINUOUS
Status: DISCONTINUED | OUTPATIENT
Start: 2018-11-17 | End: 2018-11-18 | Stop reason: HOSPADM

## 2018-11-17 RX ORDER — ACARBOSE 50 MG/1
25 TABLET ORAL
Status: DISCONTINUED | OUTPATIENT
Start: 2018-11-18 | End: 2018-11-17 | Stop reason: DRUGHIGH

## 2018-11-17 RX ORDER — ACARBOSE 50 MG/1
25 TABLET ORAL
Status: DISCONTINUED | OUTPATIENT
Start: 2018-11-18 | End: 2018-11-18 | Stop reason: HOSPADM

## 2018-11-17 RX ADMIN — ATENOLOL 25 MG: 25 TABLET ORAL at 08:53

## 2018-11-17 RX ADMIN — OYSTER SHELL CALCIUM WITH VITAMIN D 1 TABLET: 500; 200 TABLET, FILM COATED ORAL at 08:57

## 2018-11-17 RX ADMIN — SODIUM CHLORIDE: 9 INJECTION, SOLUTION INTRAVENOUS at 17:29

## 2018-11-17 RX ADMIN — MELOXICAM 7.5 MG: 7.5 TABLET ORAL at 08:52

## 2018-11-17 RX ADMIN — MENTHOL, METHYL SALICYLATE: 10; 15 CREAM TOPICAL at 20:55

## 2018-11-17 RX ADMIN — LINAGLIPTIN 5 MG: 5 TABLET, FILM COATED ORAL at 08:53

## 2018-11-17 RX ADMIN — LOSARTAN POTASSIUM 100 MG: 50 TABLET ORAL at 08:54

## 2018-11-17 RX ADMIN — PAROXETINE HYDROCHLORIDE 30 MG: 20 TABLET, FILM COATED ORAL at 08:53

## 2018-11-17 RX ADMIN — MENTHOL, METHYL SALICYLATE: 10; 15 CREAM TOPICAL at 17:00

## 2018-11-17 RX ADMIN — ACETAMINOPHEN 500 MG: 500 TABLET ORAL at 20:53

## 2018-11-17 RX ADMIN — ACETAMINOPHEN 500 MG: 500 TABLET ORAL at 08:52

## 2018-11-17 RX ADMIN — SENNOSIDES 8.6 MG: 8.6 TABLET, FILM COATED ORAL at 08:52

## 2018-11-17 RX ADMIN — ASPIRIN 81 MG: 81 TABLET ORAL at 08:53

## 2018-11-17 RX ADMIN — GLIMEPIRIDE 4 MG: 4 TABLET ORAL at 08:53

## 2018-11-17 RX ADMIN — SIMVASTATIN 20 MG: 20 TABLET, FILM COATED ORAL at 08:52

## 2018-11-17 RX ADMIN — MULTIPLE VITAMINS W/ MINERALS TAB 1 TABLET: TAB at 08:52

## 2018-11-17 RX ADMIN — GLIMEPIRIDE 4 MG: 4 TABLET ORAL at 20:53

## 2018-11-17 RX ADMIN — ENOXAPARIN SODIUM 40 MG: 40 INJECTION SUBCUTANEOUS at 17:30

## 2018-11-17 RX ADMIN — ATENOLOL 25 MG: 25 TABLET ORAL at 20:53

## 2018-11-17 ASSESSMENT — PAIN SCALES - GENERAL
PAINLEVEL_OUTOF10: 0
PAINLEVEL_OUTOF10: 5
PAINLEVEL_OUTOF10: 3
PAINLEVEL_OUTOF10: 0

## 2018-11-17 NOTE — PLAN OF CARE
Problem: Falls - Risk of:  Goal: Will remain free from falls  Will remain free from falls   Outcome: Met This Shift      Problem: Safety:  Goal: Ability to remain free from injury will improve  Ability to remain free from injury will improve   Outcome: Met This Shift

## 2018-11-17 NOTE — PLAN OF CARE
Problem: Nutrition  Goal: Optimal nutrition therapy  Outcome: Ongoing  Nutrition Problem: Inadequate oral intake  Intervention: Food and/or Nutrient Delivery: Continue current diet, Start ONS  Nutritional Goals: Patient will consume 50-75% of most meals and supplements served

## 2018-11-17 NOTE — PROGRESS NOTES
Nutrition Assessment    Type and Reason for Visit: Initial, Positive Nutrition Screen    Nutrition Recommendations: Recommend and start Glucerna supplement BID d/t decreased po intake of meals. Nutrition Assessment: Pt admitted with AMS ; poor appetite PTA ; no evidence of any recent weight loss ; will start Glucerna supplement BID    Malnutrition Assessment:  · Malnutrition Status: At risk for malnutrition  · Context: Acute illness or injury  · Findings of the 6 clinical characteristics of malnutrition (Minimum of 2 out of 6 clinical characteristics is required to make the diagnosis of moderate or severe Protein Calorie Malnutrition based on AND/ASPEN Guidelines):  1. Energy Intake- (<75% x 3 days),  (x 3 days)    2. Weight Loss-No significant weight loss,    3. Fat Loss-Unable to assess,    4. Muscle Loss-Unable to assess,    5. Fluid Accumulation-No significant fluid accumulation,    6.  Strength-Not measured    Nutrition Risk Level: Low    Nutrient Needs:  · Estimated Daily Total Kcal: 7560-8697 (REE 1025 x 1.1 SF)  · Estimated Daily Protein (g): 65-77 (1.0-1.2)  · Estimated Daily Total Fluid (ml/day): 3265-6566    Nutrition Diagnosis:   · Problem: Inadequate oral intake  · Etiology: related to Insufficient energy/nutrient consumption     Signs and symptoms:  as evidenced by Diet history of poor intake, Intake 25-50%    Objective Information:  · Nutrition-Focused Physical Findings: I&Os WNL ; no edema ;  Active BS ; flat/soft abd ; A&O x 1 ; missing teeth ; weakness ; poor appetite PTA per nursing     · Wound Type: None     · Current Nutrition Therapies:  · Oral Diet Orders: Carb Control 4 Carbs/Meal, Low Fat, 2gm Sodium   · Oral Diet intake: 26-50% (per flowsheets ; poor appetite PTA)     · Anthropometric Measures:  · Ht: 5' 4\" (162.6 cm)   · Current Body Wt: 142 lb (64.4 kg) (11/15/18, bedscale)  · Admission Body Wt: 142 lb (64.4 kg) (11/15/18, bedscale)  · Usual Body Wt: 145 lb (65.8 kg) (8/8/18,

## 2018-11-17 NOTE — PROGRESS NOTES
Subjective: The patient is awake and alert. No problems overnight. Denies chest pain, angina, and dyspnea. Denies abdominal pain. Poor appetite, headache and pain over both sides of her neck and forehead. No nausea or vomiting.     Current Facility-Administered Medications: 0.9 % sodium chloride infusion, , Intravenous, Continuous  muscle rub cream, , Topical, 4x Daily  enoxaparin (LOVENOX) injection 40 mg, 40 mg, Subcutaneous, Daily  glucose (GLUTOSE) 40 % oral gel 15 g, 15 g, Oral, PRN  dextrose 50 % solution 12.5 g, 12.5 g, Intravenous, PRN  glucagon (rDNA) injection 1 mg, 1 mg, Intramuscular, PRN  dextrose 5 % solution, 100 mL/hr, Intravenous, PRN  acarbose (PRECOSE) tablet 25 mg, 25 mg, Oral, TID WC  acetaminophen (TYLENOL) tablet 650 mg, 650 mg, Oral, Q6H PRN  acetaminophen (TYLENOL) tablet 500 mg, 500 mg, Oral, BID  atenolol (TENORMIN) tablet 25 mg, 25 mg, Oral, BID  aspirin EC tablet 81 mg, 81 mg, Oral, Daily  empagliflozin (JARDIANCE) tablet TABS 25 mg, 25 mg, Oral, Daily  glimepiride (AMARYL) tablet 4 mg, 4 mg, Oral, BID  lidocaine 4 % external patch 1 patch, 1 patch, Topical, Daily  losartan (COZAAR) tablet 100 mg, 100 mg, Oral, Daily  meloxicam (MOBIC) tablet 7.5 mg, 7.5 mg, Oral, Daily  meclizine (ANTIVERT) tablet 25 mg, 25 mg, Oral, TID PRN  therapeutic multivitamin-minerals 1 tablet, 1 tablet, Oral, Daily  PARoxetine (PAXIL) tablet 30 mg, 30 mg, Oral, Daily  rivastigmine (EXELON) 9.5 MG/24HR 1 patch, 1 patch, Transdermal, Daily  senna (SENOKOT) tablet 8.6 mg, 1 tablet, Oral, Daily  simvastatin (ZOCOR) tablet 20 mg, 20 mg, Oral, Daily  linagliptin (TRADJENTA) tablet 5 mg, 5 mg, Oral, Daily  calcium-vitamin D (OSCAL-500) 500-200 MG-UNIT per tablet 1 tablet, 1 tablet, Oral, Daily    Objective:    BP (!) 115/47   Pulse 62   Temp 98.4 °F (36.9 °C) (Oral)   Resp 18   Ht 5' 4\" (1.626 m)   Wt 135 lb 14.4 oz (61.6 kg)   SpO2 92%   BMI 23.33 kg/m²   In: 60 [P.O.:60]  Out: -    In: 60   Out: - Heart:RRR, no murmurs, no gallops, or rubs. Lungs:CTA bilaterally, no wheeze, rales or rhonchi  Abdomen:bowel sounds present, nontender, nondistended, no masses  No clubbing, cyanosis, or edema  No neuro changes   Noted tenderness to palpation on both sides of her neck  CBC:   Lab Results   Component Value Date    WBC 6.2 11/15/2018    RBC 5.04 11/15/2018    HGB 14.9 11/15/2018    HCT 46.4 11/15/2018    MCV 92.1 11/15/2018    MCH 29.6 11/15/2018    MCHC 32.1 11/15/2018    RDW 13.0 11/15/2018     11/15/2018    MPV 9.9 11/15/2018     CMP:    Lab Results   Component Value Date     11/15/2018    K 4.0 11/15/2018     11/15/2018    CO2 27 11/15/2018    BUN 19 11/15/2018    CREATININE 0.7 11/15/2018    GFRAA >60 11/15/2018    LABGLOM >60 11/15/2018    GLUCOSE 162 11/15/2018    GLUCOSE 150 03/09/2012    PROT 7.6 11/15/2018    LABALBU 4.4 11/15/2018    LABALBU 4.4 12/13/2011    CALCIUM 9.5 11/15/2018    BILITOT 0.4 11/15/2018    ALKPHOS 56 11/15/2018    AST 16 11/15/2018    ALT 12 11/15/2018     U/A:    Lab Results   Component Value Date    COLORU Yellow 11/15/2018    PROTEINU Negative 11/15/2018    PHUR 5.5 11/15/2018    WBCUA 2-5 11/15/2018    WBCUA NONE 03/09/2012    RBCUA 1-3 11/15/2018    RBCUA NONE 06/10/2013    YEAST RARE 11/15/2018    BACTERIA FEW 11/15/2018    CLARITYU Clear 11/15/2018    SPECGRAV 1.020 11/15/2018    LEUKOCYTESUR Negative 11/15/2018    UROBILINOGEN 0.2 11/15/2018    BILIRUBINUR Negative 11/15/2018    BILIRUBINUR NEGATIVE 03/09/2012    BLOODU TRACE 11/15/2018    GLUCOSEU >=1000 11/15/2018    GLUCOSEU NEGATIVE 03/09/2012    AMORPHOUS MANY 05/01/2014        Rad:    CT Head WO Contrast   Final Result      1. No acute findings. XR CHEST 1 VW   Final Result   No acute cardiopulmonary disease.          XR CERVICAL SPINE (2-3 VIEWS)    (Results Pending)       ECG: LAD with poor R wave progression    Assessment:  Altered mental status mostly drug induced w/o evidence of

## 2018-11-18 VITALS
TEMPERATURE: 97.8 F | HEART RATE: 58 BPM | HEIGHT: 64 IN | BODY MASS INDEX: 23.2 KG/M2 | OXYGEN SATURATION: 93 % | RESPIRATION RATE: 17 BRPM | DIASTOLIC BLOOD PRESSURE: 52 MMHG | SYSTOLIC BLOOD PRESSURE: 107 MMHG | WEIGHT: 135.9 LBS

## 2018-11-18 LAB
ANION GAP SERPL CALCULATED.3IONS-SCNC: 9 MMOL/L (ref 7–16)
BUN BLDV-MCNC: 21 MG/DL (ref 8–23)
CALCIUM SERPL-MCNC: 8.8 MG/DL (ref 8.6–10.2)
CHLORIDE BLD-SCNC: 103 MMOL/L (ref 98–107)
CO2: 27 MMOL/L (ref 22–29)
CREAT SERPL-MCNC: 0.7 MG/DL (ref 0.5–1)
GFR AFRICAN AMERICAN: >60
GFR NON-AFRICAN AMERICAN: >60 ML/MIN/1.73
GLUCOSE BLD-MCNC: 102 MG/DL (ref 74–99)
METER GLUCOSE: 107 MG/DL (ref 74–99)
METER GLUCOSE: 166 MG/DL (ref 74–99)
POTASSIUM SERPL-SCNC: 4.2 MMOL/L (ref 3.5–5)
SODIUM BLD-SCNC: 139 MMOL/L (ref 132–146)

## 2018-11-18 PROCEDURE — 6370000000 HC RX 637 (ALT 250 FOR IP): Performed by: INTERNAL MEDICINE

## 2018-11-18 PROCEDURE — 80048 BASIC METABOLIC PNL TOTAL CA: CPT

## 2018-11-18 PROCEDURE — 2580000003 HC RX 258: Performed by: INTERNAL MEDICINE

## 2018-11-18 PROCEDURE — G0378 HOSPITAL OBSERVATION PER HR: HCPCS

## 2018-11-18 PROCEDURE — 82962 GLUCOSE BLOOD TEST: CPT

## 2018-11-18 PROCEDURE — 6360000002 HC RX W HCPCS: Performed by: INTERNAL MEDICINE

## 2018-11-18 PROCEDURE — 36415 COLL VENOUS BLD VENIPUNCTURE: CPT

## 2018-11-18 PROCEDURE — 96372 THER/PROPH/DIAG INJ SC/IM: CPT

## 2018-11-18 RX ADMIN — LINAGLIPTIN 5 MG: 5 TABLET, FILM COATED ORAL at 08:11

## 2018-11-18 RX ADMIN — SODIUM CHLORIDE: 9 INJECTION, SOLUTION INTRAVENOUS at 07:00

## 2018-11-18 RX ADMIN — MULTIPLE VITAMINS W/ MINERALS TAB 1 TABLET: TAB at 08:10

## 2018-11-18 RX ADMIN — MELOXICAM 7.5 MG: 7.5 TABLET ORAL at 08:11

## 2018-11-18 RX ADMIN — ENOXAPARIN SODIUM 40 MG: 40 INJECTION SUBCUTANEOUS at 08:08

## 2018-11-18 RX ADMIN — GLIMEPIRIDE 4 MG: 4 TABLET ORAL at 08:10

## 2018-11-18 RX ADMIN — OYSTER SHELL CALCIUM WITH VITAMIN D 1 TABLET: 500; 200 TABLET, FILM COATED ORAL at 08:10

## 2018-11-18 RX ADMIN — PAROXETINE HYDROCHLORIDE 30 MG: 20 TABLET, FILM COATED ORAL at 08:10

## 2018-11-18 RX ADMIN — ACARBOSE 25 MG: 50 TABLET ORAL at 11:29

## 2018-11-18 RX ADMIN — ASPIRIN 81 MG: 81 TABLET ORAL at 08:10

## 2018-11-18 RX ADMIN — MENTHOL, METHYL SALICYLATE: 10; 15 CREAM TOPICAL at 08:12

## 2018-11-18 RX ADMIN — ACARBOSE 25 MG: 50 TABLET ORAL at 08:11

## 2018-11-18 RX ADMIN — SENNOSIDES 8.6 MG: 8.6 TABLET, FILM COATED ORAL at 08:11

## 2018-11-18 RX ADMIN — SIMVASTATIN 20 MG: 20 TABLET, FILM COATED ORAL at 08:10

## 2018-11-18 RX ADMIN — ATENOLOL 25 MG: 25 TABLET ORAL at 08:10

## 2018-11-18 RX ADMIN — LOSARTAN POTASSIUM 100 MG: 50 TABLET ORAL at 08:11

## 2018-11-18 RX ADMIN — ACETAMINOPHEN 500 MG: 500 TABLET ORAL at 08:11

## 2018-11-18 ASSESSMENT — PAIN SCALES - GENERAL
PAINLEVEL_OUTOF10: 0

## 2018-11-18 NOTE — PROGRESS NOTES
BMI 23.33 kg/m²   In: 735 [P.O.:120; I.V.:615]  Out: 0    In: 735   Out: 0    Heart:RRR, no murmurs, no gallops, or rubs. Lungs:CTA bilaterally, no wheeze, rales or rhonchi  Abdomen:bowel sounds present, nontender, nondistended, no masses  No clubbing, cyanosis, or edema  No neuro changes   No more tenderness to palpation on both sides of her neck  CBC:   Lab Results   Component Value Date    WBC 6.2 11/15/2018    RBC 5.04 11/15/2018    HGB 14.9 11/15/2018    HCT 46.4 11/15/2018    MCV 92.1 11/15/2018    MCH 29.6 11/15/2018    MCHC 32.1 11/15/2018    RDW 13.0 11/15/2018     11/15/2018    MPV 9.9 11/15/2018     CMP:    Lab Results   Component Value Date     11/18/2018    K 4.2 11/18/2018     11/18/2018    CO2 27 11/18/2018    BUN 21 11/18/2018    CREATININE 0.7 11/18/2018    GFRAA >60 11/18/2018    LABGLOM >60 11/18/2018    GLUCOSE 102 11/18/2018    GLUCOSE 150 03/09/2012    PROT 7.6 11/15/2018    LABALBU 4.4 11/15/2018    LABALBU 4.4 12/13/2011    CALCIUM 8.8 11/18/2018    BILITOT 0.4 11/15/2018    ALKPHOS 56 11/15/2018    AST 16 11/15/2018    ALT 12 11/15/2018     U/A:    Lab Results   Component Value Date    COLORU Yellow 11/15/2018    PROTEINU Negative 11/15/2018    PHUR 5.5 11/15/2018    WBCUA 2-5 11/15/2018    WBCUA NONE 03/09/2012    RBCUA 1-3 11/15/2018    RBCUA NONE 06/10/2013    YEAST RARE 11/15/2018    BACTERIA FEW 11/15/2018    CLARITYU Clear 11/15/2018    SPECGRAV 1.020 11/15/2018    LEUKOCYTESUR Negative 11/15/2018    UROBILINOGEN 0.2 11/15/2018    BILIRUBINUR Negative 11/15/2018    BILIRUBINUR NEGATIVE 03/09/2012    BLOODU TRACE 11/15/2018    GLUCOSEU >=1000 11/15/2018    GLUCOSEU NEGATIVE 03/09/2012    AMORPHOUS MANY 05/01/2014        Rad:    XR CERVICAL SPINE (4-5 VIEWS)   Final Result   1. Age appropriate degenerative changes otherwise unremarkable   cervical spine. .         CT Head WO Contrast   Final Result      1. No acute findings.       XR CHEST 1 VW   Final Result   No acute cardiopulmonary disease. ECG: LAD with poor R wave progression    Assessment:  Altered mental status mostly drug induced w/o evidence of infection  Neck pain with paravertebral muscle contraction headache  Senile Dementia  Type 2 DM w/o complications or Hypoglycemia  HTN on target  Mild dehydration/ resolved  Hypercholesterolemia on statin  DDD Cervical spine    Plan:met with our patient daughter Maritza Walsh yesterday , will plan to transfer patient back to [facility] today, start Physical Therapy and make tylenol ES straight q6, C-Spine Xray showed DDD, continue to use moist heat, lidocaine patches and rub with icy hot with lidocaine.           Electronically signed by Pelon Cast MD on 11/18/2018 at 10:58 AM

## 2018-12-15 NOTE — DISCHARGE SUMMARY
Discharge Summary    Emily Fields  :  6/3/1923  MRN:  [id number]    Admit date:  11/15/2018  Discharge date: 2018    Admitting Physician:  Case Eubanks MD    Discharge Diagnoses: Active Problems:    Altered mental state  Resolved Problems:    * No resolved hospital problems. *      Consults: None    HPI/Hospital Course: 80years old white female who has a past medical history significant for senile dementia diabetes osteoarthritis and hypertension ,who was transferred from [address] home after being more confused, dizzy, and change in her mental status worse than her baseline, who woke up in the ER included CT of the head which showed no acute intracranial abnormality, but did confirm diffuse cerebral atrophy and white matter microvascular disease, her EKG showed left axis deviation and poor R-wave progression, urinalysis showed glucose but no evidence of infection, her chemistry surprisingly were all WNL, there was a questionable Benadryl being given by her daughter at the nursing home, after further investigation and questioning the daughter she denied that matter, patient was admitted , physical therapy was also to see the patient, gentle hydration was given, patient during her stay complaining of significant tension headache and pain on both sides of her neck, x-rays showed degenerative disc disease, treatment was delivered aggressively with Tylenol extra strength straight every 6 hours, along with lidocaine patches and icy hot rub own frequently. Overall patient did not require any IV antibiotics during her stay, which was uneventful without any major complications, and was transferred back to [facility] on  in a stable condition, and to resume physical therapy and rehabilitation there.     Discharge Medications:    Discharge Medication List as of 2018 11:29 AM           Details   !! acetaminophen (TYLENOL) 325 MG tablet Take 650 mg by mouth every 6 hours as needed for PainHistorical Med      glimepiride (AMARYL) 4 MG tablet Take 4 mg by mouth 2 times dailyHistorical Med      Calcium Carbonate-Vitamin D (CALCIUM-VITAMIN D) 600-125 MG-UNIT TABS Take 1 tablet by mouth dailyHistorical Med      lidocaine (LIDODERM) 5 % Place 1 patch onto the skin 2 times daily Apply to left flankHistorical Med      meloxicam (MOBIC) 7.5 MG tablet Take 7.5 mg by mouth dailyHistorical Med      senna (SENOKOT) 8.6 MG tablet Take 1 tablet by mouth dailyHistorical Med      simvastatin (ZOCOR) 20 MG tablet Take 1 tablet by mouth nightly, Disp-30 tablet, R-3Normal      linagliptin (TRADJENTA) 5 MG tablet Take 5 mg by mouth daily Historical Med      Multiple Vitamins-Minerals (THERAPEUTIC MULTIVITAMIN-MINERALS) tablet Take 1 tablet by mouth daily Historical Med      !! acetaminophen (TYLENOL) 500 MG tablet Take 500 mg by mouth 2 times daily Historical Med      rivastigmine (EXELON) 9.5 MG/24HR Place 1 patch onto the skin daily Historical Med      empagliflozin (JARDIANCE) 25 MG tablet Take 25 mg by mouth dailyHistorical Med      acarbose (PRECOSE) 25 MG tablet Take 25 mg by mouth 3 times daily (with meals)Historical Med      losartan (COZAAR) 100 MG tablet Take 100 mg by mouth daily Historical Med      aspirin 81 MG EC tablet Take 81 mg by mouth daily Historical Med      atenolol (TENORMIN) 25 MG tablet Take 25 mg by mouth 2 times daily. Historical Med      paroxetine (PAXIL) 30 MG tablet Take 30 mg by mouth daily Historical Med      fish oil-omega-3 fatty acids 1000 MG capsule Take 1 g by mouth daily Historical Med       !! - Potential duplicate medications found. Please discuss with provider. Discharge Medication List as of 11/18/2018 11:29 AM      STOP taking these medications       meclizine (ANTIVERT) 25 MG tablet Comments:   Reason for Stopping:                    Disposition: [address]    Follow-up in office in next 2-4 weeks.     Note that over 30 minutes was spent in preparing discharge papers, discussing discharge with patient, medication review, etc.      Signed:  Electronically signed by Sushma Lyle MD on 12/15/2018 at 10:39 AM

## 2019-07-26 ENCOUNTER — HOSPITAL ENCOUNTER (EMERGENCY)
Age: 84
Discharge: HOME | End: 2019-07-26
Payer: COMMERCIAL

## 2019-07-26 VITALS
RESPIRATION RATE: 16 BRPM | OXYGEN SATURATION: 93 % | HEART RATE: 86 BPM | DIASTOLIC BLOOD PRESSURE: 68 MMHG | SYSTOLIC BLOOD PRESSURE: 116 MMHG | TEMPERATURE: 98.9 F

## 2019-07-26 DIAGNOSIS — J20.9 ACUTE BRONCHITIS, UNSPECIFIED ORGANISM: Primary | ICD-10-CM

## 2019-07-26 PROCEDURE — 99212 OFFICE O/P EST SF 10 MIN: CPT

## 2019-07-26 RX ORDER — ALBUTEROL SULFATE 90 UG/1
2 AEROSOL, METERED RESPIRATORY (INHALATION) 4 TIMES DAILY PRN
Qty: 1 INHALER | Refills: 0 | Status: SHIPPED | OUTPATIENT
Start: 2019-07-26

## 2019-07-26 RX ORDER — AZITHROMYCIN 250 MG/1
250 TABLET, FILM COATED ORAL SEE ADMIN INSTRUCTIONS
Qty: 6 TABLET | Refills: 0 | Status: SHIPPED | OUTPATIENT
Start: 2019-07-26 | End: 2019-07-31

## 2019-08-02 ENCOUNTER — HOSPITAL ENCOUNTER (EMERGENCY)
Age: 84
Discharge: HOME | End: 2019-08-03
Attending: EMERGENCY MEDICINE
Payer: COMMERCIAL

## 2019-08-02 ENCOUNTER — APPOINTMENT (OUTPATIENT)
Dept: GENERAL RADIOLOGY | Age: 84
End: 2019-08-02
Payer: COMMERCIAL

## 2019-08-02 VITALS
SYSTOLIC BLOOD PRESSURE: 132 MMHG | DIASTOLIC BLOOD PRESSURE: 80 MMHG | OXYGEN SATURATION: 96 % | TEMPERATURE: 98.2 F | WEIGHT: 166 LBS | HEART RATE: 80 BPM | BODY MASS INDEX: 28.49 KG/M2 | RESPIRATION RATE: 19 BRPM

## 2019-08-02 DIAGNOSIS — J20.9 ACUTE BRONCHITIS, UNSPECIFIED ORGANISM: Primary | ICD-10-CM

## 2019-08-02 LAB
ALBUMIN SERPL-MCNC: 4 G/DL (ref 3.5–5.2)
ALP BLD-CCNC: 75 U/L (ref 35–104)
ALT SERPL-CCNC: 11 U/L (ref 0–32)
ANION GAP SERPL CALCULATED.3IONS-SCNC: 16 MMOL/L (ref 7–16)
AST SERPL-CCNC: 15 U/L (ref 0–31)
BACTERIA: ABNORMAL /HPF
BASOPHILS ABSOLUTE: 0.03 E9/L (ref 0–0.2)
BASOPHILS RELATIVE PERCENT: 0.3 % (ref 0–2)
BILIRUB SERPL-MCNC: 0.3 MG/DL (ref 0–1.2)
BILIRUBIN URINE: NEGATIVE
BLOOD, URINE: ABNORMAL
BUN BLDV-MCNC: 22 MG/DL (ref 8–23)
CALCIUM SERPL-MCNC: 9.6 MG/DL (ref 8.6–10.2)
CHLORIDE BLD-SCNC: 97 MMOL/L (ref 98–107)
CLARITY: ABNORMAL
CO2: 25 MMOL/L (ref 22–29)
COLOR: YELLOW
CREAT SERPL-MCNC: 0.9 MG/DL (ref 0.5–1)
EOSINOPHILS ABSOLUTE: 0.09 E9/L (ref 0.05–0.5)
EOSINOPHILS RELATIVE PERCENT: 1 % (ref 0–6)
GFR AFRICAN AMERICAN: >60
GFR NON-AFRICAN AMERICAN: 58 ML/MIN/1.73
GLUCOSE BLD-MCNC: 273 MG/DL (ref 74–99)
GLUCOSE URINE: >=1000 MG/DL
HCT VFR BLD CALC: 42.8 % (ref 34–48)
HEMOGLOBIN: 14.1 G/DL (ref 11.5–15.5)
IMMATURE GRANULOCYTES #: 0.06 E9/L
IMMATURE GRANULOCYTES %: 0.6 % (ref 0–5)
KETONES, URINE: NEGATIVE MG/DL
LACTIC ACID: 1.5 MMOL/L (ref 0.5–2.2)
LEUKOCYTE ESTERASE, URINE: ABNORMAL
LIPASE: 26 U/L (ref 13–60)
LYMPHOCYTES ABSOLUTE: 1.83 E9/L (ref 1.5–4)
LYMPHOCYTES RELATIVE PERCENT: 19.8 % (ref 20–42)
MCH RBC QN AUTO: 30.3 PG (ref 26–35)
MCHC RBC AUTO-ENTMCNC: 32.9 % (ref 32–34.5)
MCV RBC AUTO: 92 FL (ref 80–99.9)
METER GLUCOSE: 209 MG/DL (ref 74–99)
METER GLUCOSE: 290 MG/DL (ref 74–99)
MONOCYTES ABSOLUTE: 1.34 E9/L (ref 0.1–0.95)
MONOCYTES RELATIVE PERCENT: 14.5 % (ref 2–12)
NEUTROPHILS ABSOLUTE: 5.91 E9/L (ref 1.8–7.3)
NEUTROPHILS RELATIVE PERCENT: 63.8 % (ref 43–80)
NITRITE, URINE: NEGATIVE
PDW BLD-RTO: 12.4 FL (ref 11.5–15)
PH UA: 6 (ref 5–9)
PLATELET # BLD: 213 E9/L (ref 130–450)
PMV BLD AUTO: 10.3 FL (ref 7–12)
POTASSIUM REFLEX MAGNESIUM: 4.5 MMOL/L (ref 3.5–5)
PROTEIN UA: NEGATIVE MG/DL
RBC # BLD: 4.65 E12/L (ref 3.5–5.5)
RBC UA: ABNORMAL /HPF (ref 0–2)
SODIUM BLD-SCNC: 138 MMOL/L (ref 132–146)
SPECIFIC GRAVITY UA: <=1.005 (ref 1–1.03)
TOTAL PROTEIN: 7.4 G/DL (ref 6.4–8.3)
TROPONIN: <0.01 NG/ML (ref 0–0.03)
UROBILINOGEN, URINE: 0.2 E.U./DL
WBC # BLD: 9.3 E9/L (ref 4.5–11.5)
WBC UA: ABNORMAL /HPF (ref 0–5)

## 2019-08-02 PROCEDURE — 82962 GLUCOSE BLOOD TEST: CPT

## 2019-08-02 PROCEDURE — 2580000003 HC RX 258: Performed by: EMERGENCY MEDICINE

## 2019-08-02 PROCEDURE — 73630 X-RAY EXAM OF FOOT: CPT

## 2019-08-02 PROCEDURE — 85025 COMPLETE CBC W/AUTO DIFF WBC: CPT

## 2019-08-02 PROCEDURE — 83605 ASSAY OF LACTIC ACID: CPT

## 2019-08-02 PROCEDURE — 99212 OFFICE O/P EST SF 10 MIN: CPT

## 2019-08-02 PROCEDURE — 81001 URINALYSIS AUTO W/SCOPE: CPT

## 2019-08-02 PROCEDURE — 80053 COMPREHEN METABOLIC PANEL: CPT

## 2019-08-02 PROCEDURE — 87088 URINE BACTERIA CULTURE: CPT

## 2019-08-02 PROCEDURE — 84484 ASSAY OF TROPONIN QUANT: CPT

## 2019-08-02 PROCEDURE — 99285 EMERGENCY DEPT VISIT HI MDM: CPT

## 2019-08-02 PROCEDURE — 71046 X-RAY EXAM CHEST 2 VIEWS: CPT

## 2019-08-02 PROCEDURE — 36415 COLL VENOUS BLD VENIPUNCTURE: CPT

## 2019-08-02 PROCEDURE — 87040 BLOOD CULTURE FOR BACTERIA: CPT

## 2019-08-02 PROCEDURE — 83690 ASSAY OF LIPASE: CPT

## 2019-08-02 RX ORDER — ACETAMINOPHEN 500 MG
500 TABLET ORAL EVERY 6 HOURS PRN
Qty: 540 TABLET | Refills: 0 | Status: SHIPPED | OUTPATIENT
Start: 2019-08-02

## 2019-08-02 RX ORDER — 0.9 % SODIUM CHLORIDE 0.9 %
1000 INTRAVENOUS SOLUTION INTRAVENOUS ONCE
Status: COMPLETED | OUTPATIENT
Start: 2019-08-02 | End: 2019-08-02

## 2019-08-02 RX ORDER — GUAIFENESIN/DEXTROMETHORPHAN 100-10MG/5
5 SYRUP ORAL 3 TIMES DAILY PRN
Qty: 120 ML | Refills: 0 | Status: SHIPPED | OUTPATIENT
Start: 2019-08-02 | End: 2019-08-12

## 2019-08-02 RX ORDER — BENZONATATE 100 MG/1
100-200 CAPSULE ORAL 3 TIMES DAILY PRN
Qty: 20 CAPSULE | Refills: 0 | Status: SHIPPED | OUTPATIENT
Start: 2019-08-02 | End: 2019-08-09

## 2019-08-02 RX ADMIN — SODIUM CHLORIDE 1000 ML: 9 INJECTION, SOLUTION INTRAVENOUS at 21:30

## 2019-08-02 ASSESSMENT — ENCOUNTER SYMPTOMS
SORE THROAT: 0
ABDOMINAL PAIN: 0
DIARRHEA: 0
VOMITING: 0
COUGH: 1
SHORTNESS OF BREATH: 0
NAUSEA: 0
RHINORRHEA: 1

## 2019-08-03 NOTE — ED PROVIDER NOTES
Patient brought in for evaluation of URI symptoms that have been present for than a week. She has been on 2 antibiotics. She knows she was on Zithromax but is not sure what the other antibiotic was. Symptoms consist of cough, congestion, chest congestion, body aches, and occasional neck pain. No chest pain or shortness of breath reported. She did go to the urgent care today and was referred here. Patient does not wear supplemental oxygen but had a low O2 sat and was placed on oxygen. No fever chills reported. They advised her to come here for further evaluation. Daughter reports that earlier she was \"listless\" but now seems to be more alert. Review of Systems   Constitutional: Positive for fatigue. Negative for chills, diaphoresis and fever. HENT: Positive for congestion and rhinorrhea. Negative for ear discharge and sore throat. Eyes: Negative for visual disturbance. Respiratory: Positive for cough ( Productive yellow sputum). Negative for shortness of breath. Cardiovascular: Negative for chest pain, palpitations and leg swelling. Gastrointestinal: Negative for abdominal pain, diarrhea, nausea and vomiting. Genitourinary: Negative for difficulty urinating, dysuria, flank pain, frequency, hematuria and urgency. Neurological: Negative for dizziness, syncope, weakness, light-headedness and headaches. Hematological: Does not bruise/bleed easily. All other systems reviewed and are negative. Physical Exam   Constitutional: She is oriented to person, place, and time. She appears well-developed and well-nourished. No distress. HENT:   Head: Normocephalic and atraumatic. Nose: Nose normal.   Mouth/Throat: Oropharynx is clear and moist. No oropharyngeal exudate. Bilateral TMs and ear canals unremarkable. Eyes: Pupils are equal, round, and reactive to light. Conjunctivae and EOM are normal.   Neck: Normal range of motion. Neck supple. No signs of meningismus.

## 2019-08-04 LAB — URINE CULTURE, ROUTINE: NORMAL

## 2019-08-08 LAB
BLOOD CULTURE, ROUTINE: NORMAL
CULTURE, BLOOD 2: NORMAL

## 2019-12-19 ENCOUNTER — TELEPHONE (OUTPATIENT)
Dept: AUDIOLOGY | Age: 84
End: 2019-12-19

## 2020-01-24 ENCOUNTER — OFFICE VISIT (OUTPATIENT)
Dept: ENT CLINIC | Age: 85
End: 2020-01-24
Payer: COMMERCIAL

## 2020-01-24 ENCOUNTER — PROCEDURE VISIT (OUTPATIENT)
Dept: AUDIOLOGY | Age: 85
End: 2020-01-24
Payer: COMMERCIAL

## 2020-01-24 VITALS — BODY MASS INDEX: 28.3 KG/M2 | HEIGHT: 63 IN | WEIGHT: 159.7 LBS

## 2020-01-24 PROCEDURE — 4040F PNEUMOC VAC/ADMIN/RCVD: CPT | Performed by: OTOLARYNGOLOGY

## 2020-01-24 PROCEDURE — 92567 TYMPANOMETRY: CPT | Performed by: AUDIOLOGIST

## 2020-01-24 PROCEDURE — G8417 CALC BMI ABV UP PARAM F/U: HCPCS | Performed by: OTOLARYNGOLOGY

## 2020-01-24 PROCEDURE — G8427 DOCREV CUR MEDS BY ELIG CLIN: HCPCS | Performed by: OTOLARYNGOLOGY

## 2020-01-24 PROCEDURE — 99203 OFFICE O/P NEW LOW 30 MIN: CPT | Performed by: OTOLARYNGOLOGY

## 2020-01-24 PROCEDURE — 1123F ACP DISCUSS/DSCN MKR DOCD: CPT | Performed by: OTOLARYNGOLOGY

## 2020-01-24 PROCEDURE — 92557 COMPREHENSIVE HEARING TEST: CPT | Performed by: AUDIOLOGIST

## 2020-01-24 PROCEDURE — 1090F PRES/ABSN URINE INCON ASSESS: CPT | Performed by: OTOLARYNGOLOGY

## 2020-01-24 PROCEDURE — G8484 FLU IMMUNIZE NO ADMIN: HCPCS | Performed by: OTOLARYNGOLOGY

## 2020-01-24 PROCEDURE — 1036F TOBACCO NON-USER: CPT | Performed by: OTOLARYNGOLOGY

## 2020-01-24 NOTE — PROGRESS NOTES
This patient was referred for audiometric/tympanometric testing by Dr. Anna Cline due to hearing loss. Patient has a right hearing aid that is not working well for her as reported by her daughter. Her left hearing aid has been lost.  She was seen following cerumen removal from ENT. Audiometry revealed a mild-to-profound hearing loss, in the right ear, and a moderate to profound hearing loss in the left ear. Un-masked air-bone gaps were present in the right ear at 4000 Hz, and from 500-4000 Hz in the left ear. Masking could not be completed due to the inability to condition the patient to the test task. Reliability was fair. Speech reception thresholds were in good agreement with the pure tone averages, bilaterally. Speech discrimination scores were poor, bilaterally. Note: Asymmetrical results, left ear worse. Tympanometry revealed normal middle ear peak pressure and compliance, in the right ear, and a flat tympanogram with a small ear canal volume in the left ear. Some cerumen was visualized and patient's ear canals are very small. ENT just removed cerumen prior to testing. The results were reviewed with the patient and her daughter. Recommendations for follow up will be made pending physician consult.     Radha Mcknight

## 2020-01-30 ASSESSMENT — ENCOUNTER SYMPTOMS
VOMITING: 0
RHINORRHEA: 0
SHORTNESS OF BREATH: 0
COUGH: 0

## 2020-01-30 NOTE — PROGRESS NOTES
[facility] Otolaryngology  Dr. Temo Mccall D.O. Ms.Ed. New Consult       Patient Name:  Melissa Orellana  :  6/3/1923     CHIEF C/O:    Chief Complaint   Patient presents with    Hearing Problem     hearing aid clearance; cerumen impaction needs removed before tested for audio       HISTORY OBTAINED FROM:  patient    HISTORY OF PRESENT ILLNESS:       Sandy Handy is a 80y.o. year old female, here today for history of hearing loss, seen and examined today with a full audiogram reviewed with the patient as well as her caregiver. Patient has no complaints of ear pain or drainage, no recent history of room spinning vertigo. No current complaints of sore throat fever chills, there is some underlying dementia and a past history of stroke in the past.  Surgeries. Past Medical History:   Diagnosis Date    [facility]     Diabetes mellitus [facility]     Hypertension     Tachycardia     TIA (transient ischemic attack)      Past Surgical History:   Procedure Laterality Date    JOINT REPLACEMENT         Current Outpatient Medications:     Empagliflozin-linaGLIPtin (GLYXAMBI) 10-5 MG TABS, Take by mouth, Disp: , Rfl:     glimepiride (AMARYL) 4 MG tablet, Take 4 mg by mouth 2 times daily, Disp: , Rfl:     Multiple Vitamins-Minerals (THERAPEUTIC MULTIVITAMIN-MINERALS) tablet, Take 1 tablet by mouth daily , Disp: , Rfl:     losartan (COZAAR) 100 MG tablet, Take 100 mg by mouth daily , Disp: , Rfl:     atenolol (TENORMIN) 25 MG tablet, Take 25 mg by mouth 2 times daily. , Disp: , Rfl:     paroxetine (PAXIL) 30 MG tablet, Take 30 mg by mouth daily , Disp: , Rfl:     acetaminophen (TYLENOL) 500 MG tablet, Take 1 tablet by mouth every 6 hours as needed for Pain (Patient not taking: Reported on 2020), Disp: 540 tablet, Rfl: 0    albuterol sulfate  (90 Base) MCG/ACT inhaler, Inhale 2 puffs into the lungs 4 times daily as needed for Wheezing (Patient not taking: Reported on 2020), Disp: 1 Inhaler, Rfl: 0   tympanogram, which we treated medically follow-up in 4 to 6 weeks consider hearing aid fittings at that time. Consider possible MRI however given patient's age and more morbidity will avoid that for this moment. Per patient and family request.    Dr. Che Causey Otolaryngology/Facial Plastic Surgery Residency  Associate Clinical Professor:  Nehemias Alonso, [facility]

## 2020-01-31 ENCOUNTER — TELEPHONE (OUTPATIENT)
Dept: AUDIOLOGY | Age: 85
End: 2020-01-31

## 2020-06-11 ENCOUNTER — PROCEDURE VISIT (OUTPATIENT)
Dept: AUDIOLOGY | Age: 85
End: 2020-06-11
Payer: COMMERCIAL

## 2020-06-11 PROCEDURE — V5160 DISPENSING FEE BINAURAL: HCPCS | Performed by: AUDIOLOGIST

## 2020-06-11 PROCEDURE — V5261 HEARING AID, DIGIT, BIN, BTE: HCPCS | Performed by: AUDIOLOGIST

## 2020-06-11 NOTE — PROGRESS NOTES
Patient seen for hearing aid pickup, of binaural BTE hearing aids by daughter, due to [address]. Patient is a resident in a nursing facility and is still under quarantine, due to Priya. Family asked to  hearing aid and approved billing patient, per insurance approval.  Approved witnessed, via phone conference call by: Chirag Cochran and Salena Snyder. Billing completed today. Patient is scheduled with her ENT provider on 7/24/2020 and will also be seen by audiology for a hearing aid check, at that time.

## 2021-11-29 ENCOUNTER — APPOINTMENT (OUTPATIENT)
Dept: ULTRASOUND IMAGING | Age: 86
End: 2021-11-29
Payer: COMMERCIAL

## 2021-11-29 ENCOUNTER — HOSPITAL ENCOUNTER (EMERGENCY)
Age: 86
Discharge: HOME | End: 2021-11-30
Attending: EMERGENCY MEDICINE
Payer: COMMERCIAL

## 2021-11-29 ENCOUNTER — APPOINTMENT (OUTPATIENT)
Dept: GENERAL RADIOLOGY | Age: 86
End: 2021-11-29
Payer: COMMERCIAL

## 2021-11-29 ENCOUNTER — APPOINTMENT (OUTPATIENT)
Dept: CT IMAGING | Age: 86
End: 2021-11-29
Payer: COMMERCIAL

## 2021-11-29 VITALS
SYSTOLIC BLOOD PRESSURE: 142 MMHG | DIASTOLIC BLOOD PRESSURE: 100 MMHG | OXYGEN SATURATION: 91 % | HEART RATE: 86 BPM | TEMPERATURE: 98.2 F | RESPIRATION RATE: 18 BRPM

## 2021-11-29 DIAGNOSIS — J69.0 ASPIRATION PNEUMONIA OF RIGHT MIDDLE LOBE DUE TO GASTRIC SECRETIONS (HCC): ICD-10-CM

## 2021-11-29 DIAGNOSIS — K59.00 CONSTIPATION, UNSPECIFIED CONSTIPATION TYPE: Primary | ICD-10-CM

## 2021-11-29 DIAGNOSIS — R10.84 GENERALIZED ABDOMINAL PAIN: ICD-10-CM

## 2021-11-29 DIAGNOSIS — F03.90 DEMENTIA WITHOUT BEHAVIORAL DISTURBANCE, UNSPECIFIED DEMENTIA TYPE: ICD-10-CM

## 2021-11-29 DIAGNOSIS — S70.02XA CONTUSION OF LEFT HIP, INITIAL ENCOUNTER: ICD-10-CM

## 2021-11-29 LAB
ALBUMIN SERPL-MCNC: 3.9 G/DL (ref 3.5–5.2)
ALP BLD-CCNC: 68 U/L (ref 35–104)
ALT SERPL-CCNC: 7 U/L (ref 0–32)
ANION GAP SERPL CALCULATED.3IONS-SCNC: 16 MMOL/L (ref 7–16)
AST SERPL-CCNC: 14 U/L (ref 0–31)
BACTERIA: ABNORMAL /HPF
BASOPHILS ABSOLUTE: 0.08 E9/L (ref 0–0.2)
BASOPHILS RELATIVE PERCENT: 0.9 % (ref 0–2)
BILIRUB SERPL-MCNC: 0.4 MG/DL (ref 0–1.2)
BILIRUBIN DIRECT: <0.2 MG/DL (ref 0–0.3)
BILIRUBIN URINE: NEGATIVE
BILIRUBIN, INDIRECT: NORMAL MG/DL (ref 0–1)
BLOOD, URINE: NEGATIVE
BUN BLDV-MCNC: 22 MG/DL (ref 6–23)
CALCIUM SERPL-MCNC: 9.4 MG/DL (ref 8.6–10.2)
CHLORIDE BLD-SCNC: 100 MMOL/L (ref 98–107)
CLARITY: CLEAR
CO2: 22 MMOL/L (ref 22–29)
COLOR: YELLOW
CREAT SERPL-MCNC: 0.9 MG/DL (ref 0.5–1)
EOSINOPHILS ABSOLUTE: 0.08 E9/L (ref 0.05–0.5)
EOSINOPHILS RELATIVE PERCENT: 0.9 % (ref 0–6)
EPITHELIAL CELLS, UA: ABNORMAL /HPF
GFR AFRICAN AMERICAN: >60
GFR NON-AFRICAN AMERICAN: 58 ML/MIN/1.73
GLUCOSE BLD-MCNC: 198 MG/DL (ref 74–99)
GLUCOSE URINE: 500 MG/DL
HCT VFR BLD CALC: 40.3 % (ref 34–48)
HEMOGLOBIN: 13.5 G/DL (ref 11.5–15.5)
KETONES, URINE: ABNORMAL MG/DL
LEUKOCYTE ESTERASE, URINE: ABNORMAL
LIPASE: 15 U/L (ref 13–60)
LYMPHOCYTES ABSOLUTE: 1.34 E9/L (ref 1.5–4)
LYMPHOCYTES RELATIVE PERCENT: 15.9 % (ref 20–42)
MCH RBC QN AUTO: 30.4 PG (ref 26–35)
MCHC RBC AUTO-ENTMCNC: 33.5 % (ref 32–34.5)
MCV RBC AUTO: 90.8 FL (ref 80–99.9)
METER GLUCOSE: 154 MG/DL (ref 74–99)
MONOCYTES ABSOLUTE: 1.26 E9/L (ref 0.1–0.95)
MONOCYTES RELATIVE PERCENT: 15 % (ref 2–12)
NEUTROPHILS ABSOLUTE: 5.63 E9/L (ref 1.8–7.3)
NEUTROPHILS RELATIVE PERCENT: 67.3 % (ref 43–80)
NITRITE, URINE: NEGATIVE
PDW BLD-RTO: 12.7 FL (ref 11.5–15)
PH UA: 5 (ref 5–9)
PLATELET # BLD: 196 E9/L (ref 130–450)
PMV BLD AUTO: 10.2 FL (ref 7–12)
POTASSIUM REFLEX MAGNESIUM: 4.6 MMOL/L (ref 3.5–5)
PROTEIN UA: NEGATIVE MG/DL
RBC # BLD: 4.44 E12/L (ref 3.5–5.5)
RBC UA: ABNORMAL /HPF (ref 0–2)
SODIUM BLD-SCNC: 138 MMOL/L (ref 132–146)
SPECIFIC GRAVITY UA: 1.02 (ref 1–1.03)
TOTAL PROTEIN: 7.5 G/DL (ref 6.4–8.3)
UROBILINOGEN, URINE: 0.2 E.U./DL
WBC # BLD: 8.4 E9/L (ref 4.5–11.5)
WBC UA: ABNORMAL /HPF (ref 0–5)

## 2021-11-29 PROCEDURE — 96375 TX/PRO/DX INJ NEW DRUG ADDON: CPT

## 2021-11-29 PROCEDURE — 2580000003 HC RX 258: Performed by: STUDENT IN AN ORGANIZED HEALTH CARE EDUCATION/TRAINING PROGRAM

## 2021-11-29 PROCEDURE — 71045 X-RAY EXAM CHEST 1 VIEW: CPT

## 2021-11-29 PROCEDURE — 99284 EMERGENCY DEPT VISIT MOD MDM: CPT

## 2021-11-29 PROCEDURE — 76705 ECHO EXAM OF ABDOMEN: CPT

## 2021-11-29 PROCEDURE — 85025 COMPLETE CBC W/AUTO DIFF WBC: CPT

## 2021-11-29 PROCEDURE — 80076 HEPATIC FUNCTION PANEL: CPT

## 2021-11-29 PROCEDURE — 82962 GLUCOSE BLOOD TEST: CPT

## 2021-11-29 PROCEDURE — 72125 CT NECK SPINE W/O DYE: CPT

## 2021-11-29 PROCEDURE — 96374 THER/PROPH/DIAG INJ IV PUSH: CPT

## 2021-11-29 PROCEDURE — 80048 BASIC METABOLIC PNL TOTAL CA: CPT

## 2021-11-29 PROCEDURE — 73502 X-RAY EXAM HIP UNI 2-3 VIEWS: CPT

## 2021-11-29 PROCEDURE — 83690 ASSAY OF LIPASE: CPT

## 2021-11-29 PROCEDURE — 6360000002 HC RX W HCPCS: Performed by: STUDENT IN AN ORGANIZED HEALTH CARE EDUCATION/TRAINING PROGRAM

## 2021-11-29 PROCEDURE — 74177 CT ABD & PELVIS W/CONTRAST: CPT

## 2021-11-29 PROCEDURE — 70450 CT HEAD/BRAIN W/O DYE: CPT

## 2021-11-29 PROCEDURE — 81001 URINALYSIS AUTO W/SCOPE: CPT

## 2021-11-29 PROCEDURE — 6360000004 HC RX CONTRAST MEDICATION: Performed by: RADIOLOGY

## 2021-11-29 RX ORDER — FENTANYL CITRATE 50 UG/ML
50 INJECTION, SOLUTION INTRAMUSCULAR; INTRAVENOUS ONCE
Status: COMPLETED | OUTPATIENT
Start: 2021-11-29 | End: 2021-11-29

## 2021-11-29 RX ORDER — LORAZEPAM 2 MG/ML
0.5 INJECTION INTRAMUSCULAR ONCE
Status: COMPLETED | OUTPATIENT
Start: 2021-11-29 | End: 2021-11-29

## 2021-11-29 RX ORDER — 0.9 % SODIUM CHLORIDE 0.9 %
500 INTRAVENOUS SOLUTION INTRAVENOUS ONCE
Status: COMPLETED | OUTPATIENT
Start: 2021-11-29 | End: 2021-11-29

## 2021-11-29 RX ORDER — AMOXICILLIN AND CLAVULANATE POTASSIUM 875; 125 MG/1; MG/1
1 TABLET, FILM COATED ORAL 2 TIMES DAILY
Qty: 20 TABLET | Refills: 0 | Status: SHIPPED | OUTPATIENT
Start: 2021-11-29 | End: 2021-12-09

## 2021-11-29 RX ORDER — POLYETHYLENE GLYCOL 3350 17 G/17G
17 POWDER, FOR SOLUTION ORAL DAILY
Qty: 1 EACH | Refills: 0 | Status: SHIPPED | OUTPATIENT
Start: 2021-11-29 | End: 2021-12-29

## 2021-11-29 RX ORDER — DOCUSATE SODIUM 100 MG/1
100 CAPSULE, LIQUID FILLED ORAL 2 TIMES DAILY
Qty: 60 CAPSULE | Refills: 0 | Status: SHIPPED | OUTPATIENT
Start: 2021-11-29 | End: 2021-12-29

## 2021-11-29 RX ORDER — LIDOCAINE 50 MG/G
1 PATCH TOPICAL DAILY
Qty: 10 PATCH | Refills: 0 | Status: SHIPPED | OUTPATIENT
Start: 2021-11-29 | End: 2021-12-09

## 2021-11-29 RX ADMIN — IOPAMIDOL 75 ML: 755 INJECTION, SOLUTION INTRAVENOUS at 17:55

## 2021-11-29 RX ADMIN — FENTANYL CITRATE 50 MCG: 50 INJECTION INTRAMUSCULAR; INTRAVENOUS at 16:14

## 2021-11-29 RX ADMIN — LORAZEPAM 0.5 MG: 2 INJECTION INTRAMUSCULAR; INTRAVENOUS at 17:53

## 2021-11-29 RX ADMIN — SODIUM CHLORIDE 500 ML: 9 INJECTION, SOLUTION INTRAVENOUS at 16:08

## 2021-11-29 ASSESSMENT — PAIN DESCRIPTION - PAIN TYPE
TYPE: ACUTE PAIN
TYPE: ACUTE PAIN

## 2021-11-29 ASSESSMENT — PAIN DESCRIPTION - LOCATION
LOCATION: HIP
LOCATION: HIP

## 2021-11-29 ASSESSMENT — PAIN DESCRIPTION - ORIENTATION
ORIENTATION: LEFT
ORIENTATION: LEFT

## 2021-11-29 ASSESSMENT — PAIN SCALES - GENERAL
PAINLEVEL_OUTOF10: 2
PAINLEVEL_OUTOF10: 7
PAINLEVEL_OUTOF10: 5

## 2021-11-29 NOTE — ED PROVIDER NOTES
ATTENDING PROVIDER ATTESTATION:     Alejandro Chaidez presented to the emergency department for evaluation of leg pain and was initially evaluated by the resident physician. See Original ED Note for H&P and ED course above. I have reviewed and discussed the case, including pertinent history (medical, surgical, family and social) and exam findings with the resident. I have personally performed and/or participated in the history, exam, medical decision making, and procedures and agree with all pertinent clinical information except for any differences as noted below. I was present for all key portions of any procedures performed during the ED course. I have reviewed my findings and recommendations with the assigned resident, the patient, and members of family present at the time of disposition. Patient presents from SNF, reported to have femur fx on XR at SNF after recent fall. Non-toxic appearing, afebrile, hemodynamically stable, and in no acute distress. At her neurologic baseline. Diffuse nonfocal abdominal ttp, no rebound/guarding/peritonitis. No acute traumatic injury found on imaging workup. Workup notable for RML infiltrates, has hx of aspiration pneumonia, will treat w/ abx. Afebrile, very mild hypoxia, no acute distress, well appearing. Imaging and overall presentation not suggestive of COVID19 infection. Also has severe constipation, will DC w/ stool softeners. Discussed findings w/ daughter who agrees w/ plan for above meds and DC back to SNF. My findings/plan:   1. Constipation, unspecified constipation type    2. Contusion of left hip, initial encounter    3. Dementia without behavioral disturbance, unspecified dementia type ([address]    4. Generalized abdominal pain    5.  Aspiration pneumonia of right middle lobe due to gastric secretions (HCC)        New Prescriptions    AMOXICILLIN-CLAVULANATE (AUGMENTIN) 875-125 MG PER TABLET    Take 1 tablet by mouth 2 times daily for 10 days    DOCUSATE SODIUM (COLACE) 100 MG CAPSULE    Take 1 capsule by mouth 2 times daily    POLYETHYLENE GLYCOL (GLYCOLAX) 17 GM/SCOOP POWDER    Take 17 g by mouth daily         Yfn Fam MD        Name: Keturah Haile   MRN: [id number]     --------------------------------------------- History of Present Illness ---------------------------------------------  11/29/21, Time: 2:39 PM EST   Chief Complaint   Patient presents with    Leg Pain     From [facility]. nurse from [place] [id number] said they performed an xray and patient has broken L femur. HPI    Keturah Haile is a 80 y.o. female, with hx of DM, HTN, Dementia (CAOx1 at baseline), who presents to the ED today for left hip pain, which began last week. Is poor historian. [facility] was contacted they stated she had a fall last week and initially had negative x-rays of the pelvis and hip however they repeated the x-rays due to continued pain in the left leg which they stated were positive for possible femur fracture. Patient is poor historian and unable to get review of systems directly from patient at this time, however nursing staff at [facility] stated she was not complain of anything else throughout the week except the left leg pain and is otherwise at her baseline. Allg: Sulfa antibiotics PCP: Axel Mccullough MD.    Meds: No current facility-administered medications for this encounter.     Current Outpatient Medications:     acetaminophen (TYLENOL) 500 MG tablet, Take 1 tablet by mouth every 6 hours as needed for Pain (Patient not taking: Reported on 1/24/2020), Disp: 540 tablet, Rfl: 0    Empagliflozin-linaGLIPtin (GLYXAMBI) 10-5 MG TABS, Take by mouth, Disp: , Rfl:     albuterol sulfate  (90 Base) MCG/ACT inhaler, Inhale 2 puffs into the lungs 4 times daily as needed for Wheezing (Patient not taking: Reported on 1/24/2020), Disp: 1 Inhaler, Rfl: 0    glimepiride (AMARYL) 4 MG tablet, Take 4 mg by mouth 2 times daily, Disp: , Rfl:     Calcium Carbonate-Vitamin D (CALCIUM-VITAMIN D) 600-125 MG-UNIT TABS, Take 1 tablet by mouth daily, Disp: , Rfl:     lidocaine (LIDODERM) 5 %, Place 1 patch onto the skin 2 times daily Apply to left flank, Disp: , Rfl:     meloxicam (MOBIC) 7.5 MG tablet, Take 7.5 mg by mouth daily, Disp: , Rfl:     senna (SENOKOT) 8.6 MG tablet, Take 1 tablet by mouth daily, Disp: , Rfl:     simvastatin (ZOCOR) 20 MG tablet, Take 1 tablet by mouth nightly (Patient not taking: Reported on 1/24/2020), Disp: 30 tablet, Rfl: 3    linagliptin (TRADJENTA) 5 MG tablet, Take 5 mg by mouth daily , Disp: , Rfl:     Multiple Vitamins-Minerals (THERAPEUTIC MULTIVITAMIN-MINERALS) tablet, Take 1 tablet by mouth daily , Disp: , Rfl:     rivastigmine (EXELON) 9.5 MG/24HR, Place 1 patch onto the skin daily , Disp: , Rfl:     empagliflozin (JARDIANCE) 25 MG tablet, Take 25 mg by mouth daily, Disp: , Rfl:     acarbose (PRECOSE) 25 MG tablet, Take 25 mg by mouth 3 times daily (with meals), Disp: , Rfl:     losartan (COZAAR) 100 MG tablet, Take 100 mg by mouth daily , Disp: , Rfl:     aspirin 81 MG EC tablet, Take 81 mg by mouth daily , Disp: , Rfl:     atenolol (TENORMIN) 25 MG tablet, Take 25 mg by mouth 2 times daily. , Disp: , Rfl:     paroxetine (PAXIL) 30 MG tablet, Take 30 mg by mouth daily , Disp: , Rfl:     fish oil-omega-3 fatty acids 1000 MG capsule, Take 1 g by mouth daily , Disp: , Rfl:      Review of Systems   Unable to perform ROS: Dementia   Musculoskeletal:        Left upper leg pain        Physical Exam  Constitutional:       General: She is not in acute distress. Appearance: Normal appearance. She is normal weight. She is not ill-appearing, toxic-appearing or diaphoretic. HENT:      Head: Normocephalic and atraumatic. Right Ear: External ear normal.      Left Ear: External ear normal.      Nose: Nose normal.      Mouth/Throat:      Pharynx: Oropharynx is clear.    Eyes:      Conjunctiva/sclera: Conjunctivae normal. Pupils: Pupils are equal, round, and reactive to light. Cardiovascular:      Rate and Rhythm: Normal rate and regular rhythm. Pulses: Normal pulses. Heart sounds: Normal heart sounds. Pulmonary:      Effort: Pulmonary effort is normal. No respiratory distress. Breath sounds: No stridor. No wheezing. Abdominal:      General: Bowel sounds are normal. There is no distension. Palpations: Abdomen is soft. Tenderness: There is abdominal tenderness (Right flank). There is no guarding. Musculoskeletal:         General: Tenderness (Left upper leg) present. No swelling. Normal range of motion. Cervical back: Normal range of motion. Right lower leg: No edema. Left lower leg: No edema. Skin:     General: Skin is warm and dry. Capillary Refill: Capillary refill takes less than 2 seconds. Findings: No erythema or rash. Neurological:      General: No focal deficit present. Mental Status: She is alert and oriented to person, place, and time. Psychiatric:         Mood and Affect: Mood normal.         Behavior: Behavior normal.          Procedures     MDM  Number of Diagnoses or Management Options  Constipation, unspecified constipation type  Contusion of left hip, initial encounter  Dementia without behavioral disturbance, unspecified dementia type (HCC)  Right flank pain  Diagnosis management comments: Mrs. Jacques Fung is a 81 y/o F pt who was brought via EMS from [facility] for left femur fracture. Nurse at the facility stated she fell last week and initially their X-rays were negative, however she continued to complain of worsening pain so they repeated the XR which showed left femur fx. They state she has hx of dementia and is pleasantly confused at baseline. Daughter is with her currently. On exam, patient lying on left side patient is conscious and alert however not oriented. Lungs clear and equal bilaterally. H RRR.   The abdomen is mildly tender across the right flank as well as lower right quadrant. She has some tenderness along the left hip and femur as well. No outward signs of trauma are found. Skin is warm and dry. Lab work, UA, x-rays of the left femur and hip, and contrast CT abdomen pelvis were ordered. UA positive glucosuria. 500 mL fluids and fentanyl given. X-ray of the left hip showed moderate osteoarthritis however no acute pathology. X-ray chest was negative for acute pathology as well. CBC unremarkable. BMP only remarkable for glucose of 198. Pt was given ativan due to some agitation while getting CT. CT abd showed diffuse colonic mild distended gallbladder, RUQ US as well as LFTs ordered. RUQ US showed sludge in gallbladder however no signed of calculus or cholecystitis. Lipase negative, Hepatic fx panel negative. Results still pending for rapid COVID at this time, dispo to be given per Dr. Berenice Hong.         Amount and/or Complexity of Data Reviewed  Tests in the radiology section of CPT®: reviewed  Decide to obtain previous medical records or to obtain history from someone other than the patient: yes            Rocio Dent MD  11/29/21 [id number]

## 2021-11-29 NOTE — ED NOTES
Bed: H1  Expected date:   Expected time:   Means of arrival:   Comments:  MICHELLE Pond 46, RN  11/29/21 3234